# Patient Record
Sex: FEMALE | Race: WHITE
[De-identification: names, ages, dates, MRNs, and addresses within clinical notes are randomized per-mention and may not be internally consistent; named-entity substitution may affect disease eponyms.]

---

## 2021-02-25 ENCOUNTER — HOSPITAL ENCOUNTER (OUTPATIENT)
Dept: HOSPITAL 61 - ER | Age: 41
Setting detail: OBSERVATION
LOS: 1 days | Discharge: HOME | End: 2021-02-26
Attending: INTERNAL MEDICINE | Admitting: INTERNAL MEDICINE
Payer: COMMERCIAL

## 2021-02-25 VITALS — SYSTOLIC BLOOD PRESSURE: 121 MMHG | DIASTOLIC BLOOD PRESSURE: 73 MMHG

## 2021-02-25 VITALS — BODY MASS INDEX: 28.31 KG/M2 | HEIGHT: 66 IN | WEIGHT: 176.15 LBS

## 2021-02-25 VITALS — SYSTOLIC BLOOD PRESSURE: 119 MMHG | DIASTOLIC BLOOD PRESSURE: 67 MMHG

## 2021-02-25 VITALS — DIASTOLIC BLOOD PRESSURE: 85 MMHG | SYSTOLIC BLOOD PRESSURE: 137 MMHG

## 2021-02-25 DIAGNOSIS — F15.10: ICD-10-CM

## 2021-02-25 DIAGNOSIS — I47.1: Primary | ICD-10-CM

## 2021-02-25 DIAGNOSIS — F32.9: ICD-10-CM

## 2021-02-25 DIAGNOSIS — R07.89: ICD-10-CM

## 2021-02-25 DIAGNOSIS — Z83.3: ICD-10-CM

## 2021-02-25 DIAGNOSIS — Z79.899: ICD-10-CM

## 2021-02-25 DIAGNOSIS — E87.6: ICD-10-CM

## 2021-02-25 DIAGNOSIS — E03.9: ICD-10-CM

## 2021-02-25 DIAGNOSIS — F90.9: ICD-10-CM

## 2021-02-25 DIAGNOSIS — D64.9: ICD-10-CM

## 2021-02-25 DIAGNOSIS — F17.210: ICD-10-CM

## 2021-02-25 LAB
ALBUMIN SERPL-MCNC: 3.5 G/DL (ref 3.4–5)
ALBUMIN/GLOB SERPL: 1 {RATIO} (ref 1–1.7)
ALP SERPL-CCNC: 69 U/L (ref 46–116)
ALT SERPL-CCNC: 33 U/L (ref 14–59)
AMPHETAMINE/METHAMPHETAMINE: (no result)
ANION GAP SERPL CALC-SCNC: 18 MMOL/L (ref 6–14)
ANISOCYTOSIS BLD QL SMEAR: SLIGHT
AST SERPL-CCNC: 33 U/L (ref 15–37)
BARBITURATES UR-MCNC: (no result) UG/ML
BASOPHILS # BLD AUTO: 0.1 X10^3/UL (ref 0–0.2)
BASOPHILS NFR BLD: 1 % (ref 0–3)
BENZODIAZ UR-MCNC: (no result) UG/L
BILIRUB SERPL-MCNC: 0.2 MG/DL (ref 0.2–1)
BIZZARE CELLS: (no result)
BUN SERPL-MCNC: 19 MG/DL (ref 7–20)
BUN/CREAT SERPL: 17 (ref 6–20)
BURR CELLS BLD QL SMEAR: (no result)
CALCIUM SERPL-MCNC: 8.4 MG/DL (ref 8.5–10.1)
CANNABINOIDS UR-MCNC: (no result) UG/L
CHLORIDE SERPL-SCNC: 104 MMOL/L (ref 98–107)
CO2 SERPL-SCNC: 19 MMOL/L (ref 21–32)
COCAINE UR-MCNC: (no result) NG/ML
CREAT SERPL-MCNC: 1.1 MG/DL (ref 0.6–1)
EOSINOPHIL NFR BLD: 0.2 X10^3/UL (ref 0–0.7)
EOSINOPHIL NFR BLD: 3 % (ref 0–3)
ERYTHROCYTE [DISTWIDTH] IN BLOOD BY AUTOMATED COUNT: 19.9 % (ref 11.5–14.5)
GFR SERPLBLD BASED ON 1.73 SQ M-ARVRAT: 54.2 ML/MIN
GLUCOSE SERPL-MCNC: 157 MG/DL (ref 70–99)
HCT VFR BLD CALC: 31.1 % (ref 36–47)
HGB BLD-MCNC: 9.4 G/DL (ref 12–15.5)
HYPOCHROMIA BLD QL SMEAR: (no result)
LYMPHOCYTES # BLD: 2.1 X10^3/UL (ref 1–4.8)
LYMPHOCYTES NFR BLD AUTO: 23 % (ref 24–48)
MCH RBC QN AUTO: 21 PG (ref 25–35)
MCHC RBC AUTO-ENTMCNC: 30 G/DL (ref 31–37)
MCV RBC AUTO: 69 FL (ref 79–100)
METHADONE SERPL-MCNC: (no result) NG/ML
MICROCYTES BLD QL SMEAR: (no result)
MONO #: 1.5 X10^3/UL (ref 0–1.1)
MONOCYTES NFR BLD: 17 % (ref 0–9)
NEUT #: 5.2 X10^3/UL (ref 1.8–7.7)
NEUTROPHILS NFR BLD AUTO: 56 % (ref 31–73)
OPIATES UR-MCNC: (no result) NG/ML
OVALOCYTES BLD QL SMEAR: (no result)
PCP SERPL-MCNC: (no result) MG/DL
PLATELET # BLD AUTO: 344 X10^3/UL (ref 140–400)
PLATELET # BLD EST: ADEQUATE 10*3/UL
POIKILOCYTOSIS BLD QL SMEAR: SLIGHT
POLYCHROMASIA BLD QL SMEAR: SLIGHT
POTASSIUM SERPL-SCNC: 2.7 MMOL/L (ref 3.5–5.1)
PROT SERPL-MCNC: 7.1 G/DL (ref 6.4–8.2)
RBC # BLD AUTO: 4.52 X10^6/UL (ref 3.5–5.4)
SCHISTOCYTES BLD QL SMEAR: (no result)
SODIUM SERPL-SCNC: 141 MMOL/L (ref 136–145)
TARGETS BLD QL SMEAR: (no result)
WBC # BLD AUTO: 9.2 X10^3/UL (ref 4–11)

## 2021-02-25 PROCEDURE — 96375 TX/PRO/DX INJ NEW DRUG ADDON: CPT

## 2021-02-25 PROCEDURE — 36415 COLL VENOUS BLD VENIPUNCTURE: CPT

## 2021-02-25 PROCEDURE — 83735 ASSAY OF MAGNESIUM: CPT

## 2021-02-25 PROCEDURE — 99285 EMERGENCY DEPT VISIT HI MDM: CPT

## 2021-02-25 PROCEDURE — 93306 TTE W/DOPPLER COMPLETE: CPT

## 2021-02-25 PROCEDURE — 84484 ASSAY OF TROPONIN QUANT: CPT

## 2021-02-25 PROCEDURE — 80053 COMPREHEN METABOLIC PANEL: CPT

## 2021-02-25 PROCEDURE — 84443 ASSAY THYROID STIM HORMONE: CPT

## 2021-02-25 PROCEDURE — 96366 THER/PROPH/DIAG IV INF ADDON: CPT

## 2021-02-25 PROCEDURE — 83880 ASSAY OF NATRIURETIC PEPTIDE: CPT

## 2021-02-25 PROCEDURE — 96361 HYDRATE IV INFUSION ADD-ON: CPT

## 2021-02-25 PROCEDURE — 80307 DRUG TEST PRSMV CHEM ANLYZR: CPT

## 2021-02-25 PROCEDURE — 71045 X-RAY EXAM CHEST 1 VIEW: CPT

## 2021-02-25 PROCEDURE — 93005 ELECTROCARDIOGRAM TRACING: CPT

## 2021-02-25 PROCEDURE — 85025 COMPLETE CBC W/AUTO DIFF WBC: CPT

## 2021-02-25 PROCEDURE — G0378 HOSPITAL OBSERVATION PER HR: HCPCS

## 2021-02-25 PROCEDURE — 80048 BASIC METABOLIC PNL TOTAL CA: CPT

## 2021-02-25 PROCEDURE — 96365 THER/PROPH/DIAG IV INF INIT: CPT

## 2021-02-25 PROCEDURE — G0379 DIRECT REFER HOSPITAL OBSERV: HCPCS

## 2021-02-25 PROCEDURE — 82728 ASSAY OF FERRITIN: CPT

## 2021-02-25 NOTE — PDOC2
KHANH HARMON APRN 2/25/21 1419:


CARDIAC CONSULT


DATE OF CONSULT


Date of Consult


DATE: 2/25/21 


TIME: 14:10





REASON FOR CONSULT


Reason for Consult:


SVT





REFERRING PHYSICIAN


Referring Physician:


Dr. Prather





SOURCE


Source:  Chart review, Patient





HISTORY OF PRESENT ILLNESS


HISTORY OF PRESENT ILLNESS


This is a 42 yo female, with a history of PSVT and recently diagnosis 

hypothyroidism initiated on lecvothyroxine, who presented secondary to chest 

pain and palpitations. Patient reports she bent over to  her phone this 

morning and began having intense palpitations and chest pressure. Could feel her

heart beating rapidly in her neck. Was nauseated and short of breath. Tried 

vagal maneuvers, but symptoms did not resolve so she came to the ED for further 

evaluation and treatment. Reports h/o PSVT since young adulathood. Has underwent

cardiac workup previously with Dr. Guadarrama. although she was not on av jose a 

blocking agent. 





Noted in SVT upon arrival. Received adenosine 6mg followed by additional 12mg 

and she convert successfully back to SR/ST. Chest pain, pressure has resolved. 

She denies any recreational drug use or significant caffeine intake.





PAST MEDICAL HISTORY


Cardiovascular:  Other (PSVT )


Psych:  Depression, Other (ADHD)


Endocrine:  Hypothyroidism





PAST SURGICAL HISTORY


Past Surgical History:  No pertinent history





FAMILY HISTORY


Family History:  Heart Disease, Hypothyroidism





SOCIAL HISTORY


Smoke:  1 pack per day


ALCOHOL:  none


Drugs:  None


Lives:  with Family





CURRENT MEDICATIONS


CURRENT MEDICATIONS





Current Medications








 Medications


  (Trade)  Dose


 Ordered  Sig/Kwame


 Route


 PRN Reason  Start Time


 Stop Time Status Last Admin


Dose Admin


 


 Adenosine


  (Adenocard)  12 mg  1X  ONCE


 IV


   2/25/21 11:45


 2/25/21 11:46 DC 2/25/21 11:40





 


 Sodium Chloride  1,000 ml @ 


 1,000 mls/hr  1X  ONCE


 IV


   2/25/21 11:45


 2/25/21 12:44 DC 2/25/21 11:40





 


 Potassium Chloride


  (Klor-Con)  40 meq  1X  ONCE


 PO


   2/25/21 12:15


 2/25/21 12:16 DC 2/25/21 13:24














ALLERGIES


ALLERGIES:  


Coded Allergies:  


     No Known Drug Allergies (Unverified , 2/25/21)





ROS


Review of System


14 point ROS conducted with pertinent positive noted above in hPi





PHYSICAL EXAM


General:  Alert, Oriented X3, Cooperative, No acute distress


HEENT:  Atraumatic, Mucous membr. moist/pink


Lungs:  Clear to auscultation


Heart:  Regular rate (SR/ST)


Abdomen:  Soft, No tenderness


Extremities:  No edema, Normal pulses


Skin:  No breakdown


Neuro:  Normal speech, Sensation intact


Psych/Mental Status:  Mental status NL, Mood NL


MUSCULOSKELETAL:  No deformity





VITALS/I&O


VITALS/I&O:





                                   Vital Signs








  Date Time  Temp Pulse Resp B/P (MAP) Pulse Ox O2 Delivery O2 Flow Rate FiO2


 


2/25/21 12:46  94 18  100   


 


2/25/21 10:54 98.2   117/77 (90)  Room Air  





 98.2       











LABS


Lab:





                                Laboratory Tests








Test


 2/25/21


11:17


 


White Blood Count


 9.2 x10^3/uL


(4.0-11.0)


 


Red Blood Count


 4.52 x10^6/uL


(3.50-5.40)


 


Hemoglobin


 9.4 g/dL


(12.0-15.5)  L


 


Hematocrit


 31.1 %


(36.0-47.0)  L


 


Mean Corpuscular Volume


 69 fL ()


L


 


Mean Corpuscular Hemoglobin


 21 pg (25-35)


L


 


Mean Corpuscular Hemoglobin


Concent 30 g/dL


(31-37)  L


 


Red Cell Distribution Width


 19.9 %


(11.5-14.5)  H


 


Platelet Count


 344 x10^3/uL


(140-400)


 


Neutrophils (%) (Auto) 56 % (31-73)  


 


Lymphocytes (%) (Auto) 23 % (24-48)  L


 


Monocytes (%) (Auto) 17 % (0-9)  H


 


Eosinophils (%) (Auto) 3 % (0-3)  


 


Basophils (%) (Auto) 1 % (0-3)  


 


Neutrophils # (Auto)


 5.2 x10^3/uL


(1.8-7.7)


 


Lymphocytes # (Auto)


 2.1 x10^3/uL


(1.0-4.8)


 


Monocytes # (Auto)


 1.5 x10^3/uL


(0.0-1.1)  H


 


Eosinophils # (Auto)


 0.2 x10^3/uL


(0.0-0.7)


 


Basophils # (Auto)


 0.1 x10^3/uL


(0.0-0.2)


 


Platelet Estimate


 Adequate


(ADEQUATE)


 


Large Platelets Few  


 


Giant Platelets Occ  


 


Polychromasia Slight  


 


Hypochromasia Marked  


 


Poikilocytosis Slight  


 


Anisocytosis Slight  


 


Microcytosis Marked  


 


Target Cells Occ  


 


Ovalocytes Few  


 


Gabriel Cells Few  


 


Schistocytes Occ  


 


RBC Morphology Bizarre Forms Occ  


 


Sodium Level


 141 mmol/L


(136-145)


 


Potassium Level


 2.7 mmol/L


(3.5-5.1)  *L


 


Chloride Level


 104 mmol/L


()


 


Carbon Dioxide Level


 19 mmol/L


(21-32)  L


 


Anion Gap 18 (6-14)  H


 


Blood Urea Nitrogen


 19 mg/dL


(7-20)


 


Creatinine


 1.1 mg/dL


(0.6-1.0)  H


 


Estimated GFR


(Cockcroft-Gault) 54.2  





 


BUN/Creatinine Ratio 17 (6-20)  


 


Glucose Level


 157 mg/dL


(70-99)  H


 


Calcium Level


 8.4 mg/dL


(8.5-10.1)  L


 


Total Bilirubin


 0.2 mg/dL


(0.2-1.0)


 


Aspartate Amino Transferase


(AST) 33 U/L (15-37)





 


Alanine Aminotransferase (ALT)


 33 U/L (14-59)





 


Alkaline Phosphatase


 69 U/L


()


 


Troponin I Quantitative


 0.036 ng/mL


(0.000-0.055)


 


NT-Pro-B-Type Natriuretic


Peptide 1424 pg/mL


(0-124)  H


 


Total Protein


 7.1 g/dL


(6.4-8.2)


 


Albumin


 3.5 g/dL


(3.4-5.0)


 


Albumin/Globulin Ratio 1.0 (1.0-1.7)  





                                Laboratory Tests


2/25/21 11:17








                                Laboratory Tests


2/25/21 11:17











ASSESSMENT/PLAN


ASSESSMENT/PLAN


1.  Chest pain, palpitations in setting of #2. Resolved 


2.  PSVT; converted back to SR with adenosine


3.  Hypokalemia; replaced 


4.  Hypothyroidism; on replacement 


5 . ADHD; on Adderall 








Recommendations





Check Mg and replace as warranted 


TSH


Echo to assess LV systolic function 


Start metoprolol for suppression


Add ASA 


Outpatient referral to EP


Supportive care





CRISTEL CHO MD 2/25/21 2088:


CARDIAC CONSULT


ASSESSMENT/PLAN


ASSESSMENT/PLAN


Pt. seen and examined. Agree with above NP note. 


Possible AVNRT or fasicular VT on differential. 


Will plan for outpt ischemic testing, referral to EP. 


Continue b-blocker.











KHANH HARMON           Feb 25, 2021 14:19


CRISTEL CHO MD       Feb 25, 2021 17:45

## 2021-02-25 NOTE — HP
ADMIT DATE:  02/25/2021



CHIEF COMPLAINT:  Palpitations.



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

works as an LPN.  She presented with palpitations, was noted to be in SVT. 

While in the ER, we also noticed that she has got hypokalemia.  We gave her 2

doses of adenosine that eventually work.  It should be noted that she is also

amphetamine/methamphetamine positive on drug screen.  I suspect this could be

playing a role.  I discussed the case with ER physician.  We are going to admit

the patient and consult Cardiology.



PAST MEDICAL HISTORY:  Possible meth abuse, depression, hypothyroidism, tobacco

abuse.



ALLERGIES:  None.



FAMILY HISTORY:  Diabetes.



SOCIAL HISTORY:  She does smoke, but no drink or drugs.  She works as an LPN.



HOME MEDICATIONS:  Adderall, Ultram, fluoxetine, Singulair, and Synthroid.



REVIEW OF SYSTEMS:  

GENERAL:  No history of weight change, weakness or fevers.

SKIN:  No bruising, hair changes or rashes.

EYES:  No blurred, double or loss of vision.

NOSE AND THROAT:  No history of nosebleeds, hoarseness or sore throat.

HEART:  No history of palpitations, chest pain or shortness of breath on

exertion.

LUNGS:  Denies cough, hemoptysis, wheezing or shortness of breath.

GASTROINTESTINAL:  Denies changes in appetite, nausea, vomiting, diarrhea or

constipation.

GENITOURINARY:  No history of frequency, urgency, hesitancy or nocturia.

NEUROLOGIC:  Denies history of numbness, tingling, tremor or weakness.

PSYCHIATRIC:  No history of panic, anxiety or depression.

ENDOCRINE:  No history of heat or cold intolerance, polyuria or polydipsia.

EXTREMITIES:  Denies muscle weakness, joint pain, pain on walking or stiffness.



PHYSICAL EXAMINATION:

VITALS:  Within normal limits and are stable.

GENERAL:  No apparent distress.  Alert and oriented.

HEENT:  Normal cephalic atraumatic, external auditory canals are patent

EYES:  Extraocular muscles are intact, pupils are equally round and reactive to

light and accommodation

MUSCULOSKELETAL:  Well developed, well nourished, good range of motion

ENDOCRINE:  No thyromegaly was palpated

LYMPHATICS:  No cervical chain or axillary nodes were noted

HEMATOPOIETIC:  No bruising

NECK:  Supple, no JVD, no thyromegaly was noted.

LUNGS:  Clear to auscultation in all lung fields without rhonchi or wheezing.

HEART:  RRR, S1, S2 present.  Peripheral pulses intact, no obvious murmurs were

noted.

ABDOMEN:  Soft, nontender.  Positive bowel sounds no organomegaly, normal bowel

sounds.

EXTREMITIES:  Without any cyanosis, clubbing, or edema.  Pedal pulses intact,

Homans sign is negative.

NEUROLOGIC:  Normal speech, normal tone.  A & O x3, moves all extremities, no

obvious focal deficits.

PSYCHIATRIC:  Normal affect, normal mood.  Stable.

SKIN:  She has multiple tattoos.

VASCULAR:  Good capillary refill, neurovascular bundle appears to be intact.



LABORATORY DATA:  White count 9, hemoglobin 9, platelets 344.  Electrolytes: 

Sodium 141, potassium 2.7, chloride 104, bicarbonate 19, BUN 19, creatinine 1.1,

glucose 157.  Troponin is 0.36, BNP 1424.  Drug screen positive for

amphetamine/methamphetamine.



ASSESSMENT AND PLAN:  SVT and with incidental finding of

amphetamine/methamphetamine positive, but she is on Adderall, so I suspect that

might account for that.  She also has hypokalemia.



PLAN:  Replace her potassium.  We gave her a couple doses of adenosine.  We have

started metoprolol per cardiology's recommendations.  Home meds, DVT

prophylaxis.  Full code.  I told her to try to avoid caffeine and cigarettes.



TOTAL TIME:  31 minutes.

 



______________________________

FLORENCIA LEARY DO DR:  KING/alona  JOB#:  [id number] / [id number]

DD:  02/25/2021 17:29  DT:  02/25/2021 18:39

## 2021-02-25 NOTE — EKG
[facility]

              [address] [place] [address]

Test Date:    2021               Test Time:    11:02:17

Pat Name:     REI STANFORD           Department:   

Patient ID:   [id number]           Room:          

Gender:       F                        Technician:   

:          1980               Requested By: SHANNEN MENDIETA

Order Number: [id number]           Reading MD:   Afshin Villanueva MD

                                 Measurements

Intervals                              Axis          

Rate:         233                      P:            

NC:                                    QRS:          188

QRSD:         156                      T:            -26

QT:           252                                    

QTc:          504                                    

                           Interpretive Statements

SVT

Electronically Signed On 3-1-2021 9:05:05 CST by Afshin Villanueva MD

## 2021-02-25 NOTE — ED.ADGEN
Past Medical History


Past Medical History


SVT





General Adult


EDM:


Chief Complaint:  RAPID HEART RATE





HPI:


HPI:





Patient is a 41-year-old female who arrives ambulatory to the emergency depart

ment complaining of palpitations as well as chest discomfort.  Patient reports 

she was cleaning her basement this morning when she stood up and felt her heart 

race in addition experiencing chest discomfort.  Patient reports she sat down 

point had the symptoms returned again.  Patient states she does have a history 

of SVT and upon arrival the patient has an elevated heart rate.  Upon my 

inspection the patient has what appears to be SVT with a heart rate above 230 

bpm.  The patient does report to feeling lightheaded in addition to having chest

discomfort.  The patient does report that she recently started new thyroid 

medication as she has hypothyroid disease.  She denies any previous illness.  

She further states she does not have pain whenever she is without palpitations. 

She is awake, alert and uncomfortable appearing.





Review of Systems:


Review of Systems:


Constitutional:   Denies fever or chills. []


Eyes:   Denies change in visual acuity. []


HENT:   Denies nasal congestion or sore throat. [] 


Respiratory:   Denies cough or shortness of breath. [] 


Cardiovascular:   Reports lightheadedness, palpitations and chest discomfort. 

Denies edema. [] 


GI:   Denies abdominal pain, nausea, vomiting, bloody stools or diarrhea. [] 


:  Denies dysuria. [] 


Musculoskeletal:   Denies back pain or joint pain. [] 


Integument:   Denies rash. [] 


Neurologic:   Denies headache, focal weakness or sensory changes. [] 


Endocrine:   Denies polyuria or polydipsia. [] 


Lymphatic:  Denies swollen glands. [] 


Psychiatric:  Denies depression or anxiety. []





Current Medications:





Current Medications








 Medications


  (Trade)  Dose


 Ordered  Sig/Kwame  Start Time


 Stop Time Status Last Admin


Dose Admin


 


 Adenosine


  (Adenocard)  12 mg  1X  ONCE  21 11:45


 21 11:46 DC 21 11:40


12 MG


 


 Potassium Chloride


  (Klor-Con)  40 meq  1X  ONCE  21 12:15


 21 12:16 DC  





 


 Sodium Chloride  1,000 ml @ 


 1,000 mls/hr  1X  ONCE  21 11:45


 21 12:44  21 11:40


1,000 MLS/HR











Allergies:


Allergies:





Allergies








Coded Allergies Type Severity Reaction Last Updated Verified


 


  No Known Drug Allergies    21 No











Physical Exam:


PE:





Constitutional: The patient appears to be in extremis and uncomfortable 

appearing.  Well developed, well nourished. []


HENT: Normocephalic, atraumatic, bilateral external ears normal, oropharynx 

moist, no oral exudates, nose normal. []


Eyes: PERRLA, EOMI, conjunctiva normal, no discharge. [] 


Neck: Normal range of motion, no tenderness, supple, no stridor. [] 


Cardiovascular: Tachycardia without murmurs or rubs.  


Lungs & Thorax:  Bilateral breath sounds clear to auscultation []


Abdomen: Bowel sounds normal, soft, no tenderness, no masses, no pulsatile 

masses. [] 


Skin: Warm, dry, no erythema, no rash. [] 


Back: No tenderness, no CVA tenderness. [] 


Extremities: No tenderness, no cyanosis, no clubbing, ROM intact, no edema. [] 


Neurologic: Alert and oriented X 3, normal motor function, normal sensory 

function, no focal deficits noted. []


Psychologic: Affect normal, judgement normal, mood normal. []





Current Patient Data:


Labs:





                                Laboratory Tests








Test


 21


11:17


 


White Blood Count


 9.2 x10^3/uL


(4.0-11.0)


 


Red Blood Count


 4.52 x10^6/uL


(3.50-5.40)


 


Hemoglobin


 9.4 g/dL


(12.0-15.5)  L


 


Hematocrit


 31.1 %


(36.0-47.0)  L


 


Mean Corpuscular Volume


 69 fL ()


L


 


Mean Corpuscular Hemoglobin


 21 pg (25-35)


L


 


Mean Corpuscular Hemoglobin


Concent 30 g/dL


(31-37)  L


 


Red Cell Distribution Width


 19.9 %


(11.5-14.5)  H


 


Platelet Count


 344 x10^3/uL


(140-400)


 


Neutrophils (%) (Auto) 56 % (31-73)  


 


Lymphocytes (%) (Auto) 23 % (24-48)  L


 


Monocytes (%) (Auto) 17 % (0-9)  H


 


Eosinophils (%) (Auto) 3 % (0-3)  


 


Basophils (%) (Auto) 1 % (0-3)  


 


Neutrophils # (Auto)


 5.2 x10^3/uL


(1.8-7.7)


 


Lymphocytes # (Auto)


 2.1 x10^3/uL


(1.0-4.8)


 


Monocytes # (Auto)


 1.5 x10^3/uL


(0.0-1.1)  H


 


Eosinophils # (Auto)


 0.2 x10^3/uL


(0.0-0.7)


 


Basophils # (Auto)


 0.1 x10^3/uL


(0.0-0.2)


 


Platelet Estimate Pending  


 


Sodium Level


 141 mmol/L


(136-145)


 


Potassium Level


 2.7 mmol/L


(3.5-5.1)  *L


 


Chloride Level


 104 mmol/L


()


 


Carbon Dioxide Level


 19 mmol/L


(21-32)  L


 


Anion Gap 18 (6-14)  H


 


Blood Urea Nitrogen


 19 mg/dL


(7-20)


 


Creatinine


 1.1 mg/dL


(0.6-1.0)  H


 


Estimated GFR


(Cockcroft-Gault) 54.2  





 


BUN/Creatinine Ratio 17 (6-20)  


 


Glucose Level


 157 mg/dL


(70-99)  H


 


Calcium Level


 8.4 mg/dL


(8.5-10.1)  L


 


Total Bilirubin


 0.2 mg/dL


(0.2-1.0)


 


Aspartate Amino Transferase


(AST) 33 U/L (15-37)





 


Alanine Aminotransferase (ALT)


 33 U/L (14-59)





 


Alkaline Phosphatase


 69 U/L


()


 


Troponin I Quantitative


 0.036 ng/mL


(0.000-0.055)


 


NT-Pro-B-Type Natriuretic


Peptide 1424 pg/mL


(0-124)  H


 


Total Protein


 7.1 g/dL


(6.4-8.2)


 


Albumin


 3.5 g/dL


(3.4-5.0)


 


Albumin/Globulin Ratio 1.0 (1.0-1.7)  





                                Laboratory Tests


21 11:17








                                Laboratory Tests


21 11:17








Vital Signs:





                                   Vital Signs








  Date Time  Temp Pulse Resp B/P (MAP) Pulse Ox O2 Delivery O2 Flow Rate FiO2


 


21 10:54 98.2 250 24 117/77 (90) 99 Room Air  





 98.2       











EKG:


EKG:


[] Initial EKG was obtained at 11:02 AM which revealed supraventricular 

tachycardia with a ventricular rate of 233 bpm.  Repeat EKG was obtained at 

11:08 AM which revealed a sinus tachycardia with a ventricular heart rate of 103

 bpm with premature atrial complexes.  There are no acute ST/T wave changes 

present to denote ischemia.  The second EKG was obtained after the 

administration of 6 and then 12 mg of adenosine which appeared to have been 

successful.





Heart Score:


HEART Score for Chest Pain:  








HEART Score for Chest Pain Response (Comments) Value


 


History Moderately Suspicious 1


 


ECG Nonspecific Repolarizatio 1


 


Age < 45 0


 


Risk Factors No Risk Factors 0


 


Troponin < Normal Limit 0


 


Total  2








Risk Factors:


Risk Factors:  DM, Current or recent (<one month) smoker, HTN, HLP, family 

history of CAD, obesity.


Risk Scores:


Score 0 - 3:  2.5% MACE over next 6 weeks - Discharge Home


Score 4 - 6:  20.3% MACE over next 6 weeks - Admit for Clinical Observation


Score 7 - 10:  72.7% MACE over next 6 weeks - Early Invasive Strategies





Radiology/Procedures:


Radiology/Procedures:


[]


Impression:


[facility]


                    [address] [place] [address]


                                 [phone]


                                        


                                 IMAGING REPORT





                                     Signed





PATIENT: REI STANFORD   ACCOUNT: [id number]     MRN#: [id number]


: 1980           LOCATION: ER              AGE: 41


SEX: F                    EXAM DT: 21         ACCESSION#: [id number]


STATUS: REG ER            ORD. PHYSICIAN: SHANNEN MENDIETA DO


REASON: Pain/palpitation


PROCEDURE: CHEST AP ONLY





XR CHEST 1V





History: Reason: Pain/palpitation / Spl. Instructions:  / History: 





Comparison: None.





Technique: Portable AP chest radiograph.





Findings:


The lungs are adequately and symmetrically inflated. No airspace consolidation, 

pleural effusion or pneumothorax. The cardiomediastinal silhouette and pulmonary

 vasculature are within normal limits. Soft tissues and osseous structures are 

unremarkable.





Impression: 


1.  No acute cardiopulmonary process.





Electronically signed by: Edgar Contreras MD (2021 12:15 PM) [facility]














DICTATED and SIGNED BY:     EDGAR CONTRERAS MD


DATE:     21 [id number] 0








Course & Med Decision Making:


Course & Med Decision Making


Pertinent Labs and Imaging studies reviewed. (See chart for details)





[]





Dragon Disclaimer:


Dragon Disclaimer:


This electronic medical record was generated, in whole or in part, using a voice

 recognition dictation system.





Departure


Departure


Impression:  


   Primary Impression:  


   Supraventricular tachycardia


   Additional Impressions:  


   Chest pain


   Hypokalemia


   Anemia


Disposition:  09 ADMITTED AS INPT THIS HOSP


Admitting Physician:  HIMS


Condition:  IMPROVED


Referrals:  


CINDY STANFORD MD (PCP)





Critical Care Note


Total Time (mins):  15


Comments


Critical care time was utilized in the assessment and treatment of the patient. 

 This is excluding procedures.





Problem Qualifiers











SHANNEN MENDIETA DO               2021 11:18

## 2021-02-25 NOTE — RAD
XR CHEST 1V



History: Reason: Pain/palpitation / Spl. Instructions:  / History: 



Comparison: None.



Technique: Portable AP chest radiograph.



Findings:

The lungs are adequately and symmetrically inflated. No airspace consolidation, pleural effusion or p
neumothorax. The cardiomediastinal silhouette and pulmonary vasculature are within normal limits. Sof
t tissues and osseous structures are unremarkable.



Impression: 

1.  No acute cardiopulmonary process.



Electronically signed by: Edgar Florian MD (2/25/2021 12:15 PM) [facility]

## 2021-02-26 VITALS — SYSTOLIC BLOOD PRESSURE: 140 MMHG | DIASTOLIC BLOOD PRESSURE: 103 MMHG

## 2021-02-26 VITALS — SYSTOLIC BLOOD PRESSURE: 126 MMHG | DIASTOLIC BLOOD PRESSURE: 86 MMHG

## 2021-02-26 VITALS — DIASTOLIC BLOOD PRESSURE: 72 MMHG | SYSTOLIC BLOOD PRESSURE: 119 MMHG

## 2021-02-26 LAB
ANION GAP SERPL CALC-SCNC: 12 MMOL/L (ref 6–14)
BUN SERPL-MCNC: 12 MG/DL (ref 7–20)
CALCIUM SERPL-MCNC: 7.9 MG/DL (ref 8.5–10.1)
CHLORIDE SERPL-SCNC: 108 MMOL/L (ref 98–107)
CO2 SERPL-SCNC: 22 MMOL/L (ref 21–32)
CREAT SERPL-MCNC: 0.6 MG/DL (ref 0.6–1)
GFR SERPLBLD BASED ON 1.73 SQ M-ARVRAT: 109.1 ML/MIN
GLUCOSE SERPL-MCNC: 92 MG/DL (ref 70–99)
MAGNESIUM SERPL-MCNC: 2 MG/DL (ref 1.8–2.4)
POTASSIUM SERPL-SCNC: 3.2 MMOL/L (ref 3.5–5.1)
SODIUM SERPL-SCNC: 142 MMOL/L (ref 136–145)

## 2021-02-26 NOTE — CARD
MR#: [id number]

Account#: [id number]

Accession#: [id number]

Date of Study: 02/26/2021

Ordering Physician: KHANH HARMON, 

Referring Physician: KHANH HARMON, 

Tech: Mili Vaughn, [facility]





--------------- APPROVED REPORT --------------





EXAM: Two-dimensional and M-mode echocardiogram with Doppler and color Doppler.



Other Information 

Quality : AverageHR: 78bpm



INDICATION

Palpitations 

Tachycardia



RISK FACTORS

Smoking 



2D DIMENSIONS 

RVDd3.0 (2.9-3.5cm)Left Atrium(2D)3.7 (1.6-4.0cm)

IVSd0.9 (0.7-1.1cm)Aortic Root(2D)3.0 (2.0-3.7cm)

LVDd5.6 (3.9-5.9cm)LVOT Diameter2.1 (1.8-2.4cm)

PWd1.0 (0.7-1.1cm)LVDs4.2 (2.5-4.0cm)

FS (%) 25.7 %SV77.2 ml

LVEF(%)50.0 (>50%)



Aortic Valve

AoV Peak Giovani.152.2cm/sAoV VTI28.9cm

AO Peak GR.9.3mmHgLVOT Peak Giovani.111.7cm/s

LVOT  VTI 20.35cmAO Mean GR.5mmHg

JOSE (VMAX)1.07du6DHI   (VTI)2.34cm2



Mitral Valve

MV E Zqjjucub49.7cm/sMV E Peak Gr.100mmHg

MV DECEL [id number] A Gclvmurs95.0cm/s

MV DYY40gtJ/A  Ratio1.0

MVA (PHT)2.77cm2



TDI

E/Lateral E'13.6E/Medial E'10.4



Pulmonary Valve

PV Peak Negcwoej81.0cm/sPV Peak Grad.3mmHg



Tricuspid Valve

TR P. Kyjgwzkp116ic/sRAP VCXVIEHT5eaSl

TR Peak Gr.06emJpEEWQ31ulWg



Pulmonary Vein

S1 Rchqlhjs14.6cm/sD2 Dkhvebxp23.6cm/s



 LEFT VENTRICLE 

The left ventricle is normal size. There is normal left ventricular wall thickness. The left ventricu
lar systolic function is normal and the ejection fraction is within normal range. The Ejection Fracti
on is 50-55%. There is normal LV segmental wall motion. Transmitral Doppler flow pattern is Grade I-a
bnormal relaxation pattern.



 RIGHT VENTRICLE 

The right ventricle is normal size. There is normal right ventricular wall thickness. The right ventr
icular systolic function is normal.



 ATRIA 

The left atrium size is normal. The right atrium size is normal. The interatrial septum is intact wit
h no evidence for an atrial septal defect or patent foramen ovale as noted on 2-D or Doppler imaging.




 AORTIC VALVE 

The aortic valve is thickened but opens well. Doppler and Color Flow revealed no significant aortic r
egurgitation. Calculated aortic valve area is 2.14 cm2 with maximum pressure gradient of 11 mmHg and 
mean pressure gradient of 6 mmHg.



 MITRAL VALVE 

The mitral valve is normal in structure and function. There is no evidence of mitral valve prolapse. 
There is no mitral valve stenosis. Doppler and Color-flow revealed trace mitral regurgitation.



 TRICUSPID VALVE 

The tricuspid valve is normal in structure and function. Doppler and Color Flow revealed trace tricus
pid regurgitation with an estimated PAP of 33 mmHg. There is no tricuspid valve stenosis.



 PULMONIC VALVE 

Doppler and Color Flow revealed trace pulmonic valvular regurgitation. There is no pulmonic valvular 
stenosis.



 GREAT VESSELS 

The aortic root is normal in size. The IVC is dilated.



 PERICARDIAL EFFUSION 

There is no evidence of significant pericardial effusion.



Critical Notification

Critical Value: No



<Conclusion>

The left ventricular systolic function is normal and the ejection fraction is within normal range. Th
e Ejection Fraction is 50-55%.

There is normal LV segmental wall motion.



Signed by : Afshin Villanueva, 

Electronically Approved : 02/26/2021 11:54:55

## 2021-02-26 NOTE — PDOC
ANAYELI PANDA APRN 2/26/21 1441:


CARDIO Progress Notes


Date and Time


Date of Service


2/26/2021


Time of Evaluation


1350





Subjective


Subjective:  No Chest Pain, No shortness of breath, No Palpitations





Vitals


Vitals





Vital Signs








  Date Time  Temp Pulse Resp B/P (MAP) Pulse Ox O2 Delivery O2 Flow Rate FiO2


 


2/26/21 11:00 99.0 89 17 119/72 (88) 99 Room Air  





 99.0       








Weight


Weight [ ]





Input and Output


Intake and Output











Intake and Output 


 


 2/26/21





 07:00


 


Intake Total 1250 ml


 


Output Total 500 ml


 


Balance 750 ml


 


 


 


Intake Oral 250 ml


 


IV Total 1000 ml


 


Output Urine Total 500 ml











Laboratory


Labs





Laboratory Tests








Test


 2/25/21


17:00 2/25/21


17:10 2/26/21


07:35


 


Urine Opiates Screen Neg (NEG)   


 


Urine Methadone Screen Neg (NEG)   


 


Urine Barbiturates Neg (NEG)   


 


Urine Phencyclidine Screen Neg (NEG)   


 


Urine


Amphetamine/Methamphetamine Pos (NEG) 


 


 





 


Urine Benzodiazepines Screen Neg (NEG)   


 


Urine Cocaine Screen Neg (NEG)   


 


Urine Cannabinoids Screen Neg (NEG)   


 


Urine Ethyl Alcohol Neg (NEG)   


 


Troponin I Quantitative


 


 0.170 ng/mL


(0.000-0.055) 





 


Sodium Level


 


 


 142 mmol/L


(136-145)


 


Potassium Level


 


 


 3.2 mmol/L


(3.5-5.1)


 


Chloride Level


 


 


 108 mmol/L


()


 


Carbon Dioxide Level


 


 


 22 mmol/L


(21-32)


 


Anion Gap   12 (6-14) 


 


Blood Urea Nitrogen


 


 


 12 mg/dL


(7-20)


 


Creatinine


 


 


 0.6 mg/dL


(0.6-1.0)


 


Estimated GFR


(Cockcroft-Gault) 


 


 109.1 





 


Glucose Level


 


 


 92 mg/dL


(70-99)


 


Calcium Level


 


 


 7.9 mg/dL


(8.5-10.1)


 


Magnesium Level


 


 


 2.0 mg/dL


(1.8-2.4)


 


Ferritin


 


 


 5 ng/mL


(8-252)


 


Thyroid Stimulating Hormone


(TSH) 


 


 0.013 uIU/mL


(0.358-3.74)











Physical Exam


HEENT:  Neck Supple W Full Motion


Chest:  Symmetric


LUNGS:  Clear to Auscultation


Heart:  RRR (SR nofurther ectopies)


Abdomen:  Soft N/T


Extremities:  No Calf Tenderness


Neurology:  alert, oriented, follow commands





Assessment


Assessment


1.  Chest pain, palpitations in setting of #2. Resolved 


2.  PSVT; converted back to SR with adenosine. EF and WM nml None further


3.  Hypokalemia; replaced 


4.  Hypothyroidism; on replacement. TSH supratherapeutic level adjustment per 

PCP


5 . ADHD; on Adderall 


6. Tobaccoism





Recommendations


1. Continue metoprolol. Replace K


2. Consider alternative to adderral


3. Outpatient referral to EP


4. Follow up in office as scheduled


5. Smoking cessation





Justicifation of Admission Dx:


Justifications for Admission:


Justification of Admission Dx:  Yes





CRISTEL CHO MD 2/26/21 2246:


CARDIO Progress Notes


Plan


Plan


Pt. seen and examined. AGree with above NP note. 


Plan for outpt ischemic eval, event monitor and EP referral.











ANAYELI PANDA APRN          Feb 26, 2021 14:41


CRISTEL CHO MD       Feb 26, 2021 22:46

## 2021-02-26 NOTE — DISCH
DISCHARGE INSTRUCTIONS


Condition on Discharge


Condition on Discharge:  Stable (You will need to follow-up with her PCP because

your Synthroid has been adjusted from 75 mcg to 50 mcg/day)





Activity After Discharge


Activity Instructions for Disc:  Activity as tolerated


Exercise Instruction after Dis:  Walk 15 min, 3 x per day


Driving Instructions after Dis:  Do not drive today





Checks after Discharge


Checks after discharge:  Check blood press - daily, Weigh Yourself Daily





Contacting the DR. after DC


Call your doctor for:  If your condition worsens





Follow-Up


Follow up with:  PCP within 2 weeks of discharge


Follow Up With:  Cardiology as needed or scheduled











BIANCA HERNANDEZ MD                    Feb 26, 2021 13:17

## 2021-02-26 NOTE — NUR
Discharge Note:



WERO STANFORD [facility]



Discharge instructions and discharge home medications reviewed with Patient and a copy 
given. All questions have been answered and understanding verbalized. 



The following instructions and handouts were given: SVT 



Discontinued lines and drains: Peripheral IV intact.



Patient discharged to Home or Self Care with Self via Ambulated

## 2021-02-26 NOTE — NUR
SS following for discharge planning. SS reviewed pt chart and discussed with pt RN. Pt is 
from home with spouse and is currently on room air. Cardiology following. Discharge plan is 
to home when medically ready. SS will continue to follow for discharge planning.

## 2021-03-03 NOTE — PDOC3
[facility]-Discharge Summary


Date of Admission:


Date of Admission:  Feb 25, 2021





Date of Discharge:


Date of Discharge:  Feb 26, 2021





Discharge Diagnosis:


Discharge Diagnosis:


1.  Chest pain, palpitations in setting of #2. Resolved 


2.  PSVT; converted back to SR with adenosine. EF and WM nml None further


3.  Hypokalemia; replaced 


4.  Hypothyroidism; on replacement. TSH supratherapeutic level adjustment per 

PCP


5 . ADHD; on Adderall 


6. Tobaccoism





Hospital Course:


Hospital Course:


By day of discharge patient was chest pain free and her HR was controlled on 

metoprolol.





Recommendations


1. Continue metoprolol. Replace K


2. Consider alternative to adderral


3. Outpatient referral to EP


4. Follow up in office as scheduled


5. Smoking cessation





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


works as an LPN.  She presented with palpitations, was noted to be in SVT. 


While in the ER, we also noticed that she has got hypokalemia.  We gave her 2


doses of adenosine that eventually work.  It should be noted that she is also


amphetamine/methamphetamine positive on drug screen.





Disposition:


Disposition/Orders:  D/C to Home





Activity:


Activity:


Resume previous activity





Diet:


Diet:  Cardiac





Medications:


Home Meds


Active Scripts


Levothyroxine Sodium (LEVOTHYROXINE SODIUM) 75 Mcg Tablet, 50 MCG PO DAILYAC for

THYROID SUPPLEMENT for 30 Days, #20 TAB 0 Refills


   Prov:BIANCA HERNANDEZ MD         2/26/21


Metoprolol Succinate (METOPROLOL SUCCINATE ( XL )) 25 Mg Tab.er.24h, 25 MG PO 

DAILY for heart rate for 30 Days, #30 TAB.SR


   Prov:BIANCA HERNANDEZ MD         2/26/21


Reported Medications


Montelukast Sodium (SINGULAIR TABLET **) 10 Mg Tablet, 10 MG PO DAILY for FOR 

ASTHMA, TAB 0 Refills


   2/25/21


Fluoxetine Hcl (FLUOXETINE HCL) 20 Mg Capsule, 1 CAP PO DAILY for  , #90 CAP 1 

Refill


   2/25/21


Discontinued Reported Medications


Tramadol Hcl (ULTRAM) 50 Mg Tablet, 1-2 TAB PO BID PRN for pain MDD 2 Tablet(s) 

for 30 Days, #60 TAB 0 Refills


   2/25/21


Dextroamphetamine/Amphetamine (ADDERALL 20 MG TABLET) 20 Mg Tablet, 1 TAB PO BID

for   MDD 2 Tablet(s) for 5 Days, #10 TAB 0 Refills


   2/25/21


Scheduled


Fluoxetine Hcl (Fluoxetine Hcl), 1 CAP PO DAILY, (Reported)


Levothyroxine Sodium (Levothyroxine Sodium), 50 MCG PO DAILYAC


Metoprolol Succinate (Metoprolol Succinate ( Xl )), 25 MG PO DAILY


Montelukast Sodium (Singulair Tablet **), 10 MG PO DAILY, (Reported)





Discontinued Medications


Dextroamphetamine/Amphetamine (Adderall 20 Mg Tablet), 1 TAB PO BID, (Reported)


Tramadol Hcl (Ultram), 1-2 TAB PO BID PRN for pain, (Reported)





Total Time:


Total Time:


Total time spent was  32  minutes in preparing scripts, discharge planning with 

SW and RN, and preparing this discharge summary. 








Patient seen and examined on day of discharge.





Justicifation of Admission Dx:


Justifications for Admission:


Justification of Admission Dx:  Yes











BIANCA HERNANDEZ MD                     Mar 3, 2021 07:46

## 2021-04-13 ENCOUNTER — HOSPITAL ENCOUNTER (EMERGENCY)
Dept: HOSPITAL 61 - ER | Age: 41
Discharge: HOME | End: 2021-04-13
Payer: COMMERCIAL

## 2021-04-13 VITALS — BODY MASS INDEX: 26.57 KG/M2 | HEIGHT: 66 IN | WEIGHT: 165.35 LBS

## 2021-04-13 VITALS — DIASTOLIC BLOOD PRESSURE: 82 MMHG | SYSTOLIC BLOOD PRESSURE: 116 MMHG

## 2021-04-13 DIAGNOSIS — I47.1: Primary | ICD-10-CM

## 2021-04-13 DIAGNOSIS — F31.9: ICD-10-CM

## 2021-04-13 DIAGNOSIS — E03.9: ICD-10-CM

## 2021-04-13 DIAGNOSIS — F41.9: ICD-10-CM

## 2021-04-13 DIAGNOSIS — F17.200: ICD-10-CM

## 2021-04-13 LAB
ALBUMIN SERPL-MCNC: 3.3 G/DL (ref 3.4–5)
ALBUMIN/GLOB SERPL: 0.9 {RATIO} (ref 1–1.7)
ALP SERPL-CCNC: 86 U/L (ref 46–116)
ALT SERPL-CCNC: 14 U/L (ref 14–59)
ANION GAP SERPL CALC-SCNC: 11 MMOL/L (ref 6–14)
ANISOCYTOSIS BLD QL SMEAR: (no result)
AST SERPL-CCNC: 13 U/L (ref 15–37)
BASOPHILS # BLD AUTO: 0.1 X10^3/UL (ref 0–0.2)
BASOPHILS NFR BLD: 1 % (ref 0–3)
BILIRUB SERPL-MCNC: 0.1 MG/DL (ref 0.2–1)
BIZZARE CELLS: (no result)
BUN SERPL-MCNC: 15 MG/DL (ref 7–20)
BUN/CREAT SERPL: 19 (ref 6–20)
BURR CELLS BLD QL SMEAR: (no result)
CALCIUM SERPL-MCNC: 8.6 MG/DL (ref 8.5–10.1)
CHLORIDE SERPL-SCNC: 104 MMOL/L (ref 98–107)
CO2 SERPL-SCNC: 23 MMOL/L (ref 21–32)
CREAT SERPL-MCNC: 0.8 MG/DL (ref 0.6–1)
EOSINOPHIL NFR BLD: 0.3 X10^3/UL (ref 0–0.7)
EOSINOPHIL NFR BLD: 3 % (ref 0–3)
ERYTHROCYTE [DISTWIDTH] IN BLOOD BY AUTOMATED COUNT: 21.4 % (ref 11.5–14.5)
GFR SERPLBLD BASED ON 1.73 SQ M-ARVRAT: 78.3 ML/MIN
GLUCOSE SERPL-MCNC: 150 MG/DL (ref 70–99)
HCT VFR BLD CALC: 34.9 % (ref 36–47)
HGB BLD-MCNC: 10.7 G/DL (ref 12–15.5)
HYPOCHROMIA BLD QL SMEAR: (no result)
LYMPHOCYTES # BLD: 2.1 X10^3/UL (ref 1–4.8)
LYMPHOCYTES NFR BLD AUTO: 22 % (ref 24–48)
MCH RBC QN AUTO: 21 PG (ref 25–35)
MCHC RBC AUTO-ENTMCNC: 31 G/DL (ref 31–37)
MCV RBC AUTO: 69 FL (ref 79–100)
MICROCYTES BLD QL SMEAR: (no result)
MONO #: 1.5 X10^3/UL (ref 0–1.1)
MONOCYTES NFR BLD: 16 % (ref 0–9)
NEUT #: 5.4 X10^3/UL (ref 1.8–7.7)
NEUTROPHILS NFR BLD AUTO: 58 % (ref 31–73)
OVALOCYTES BLD QL SMEAR: (no result)
PLATELET # BLD AUTO: 452 X10^3/UL (ref 140–400)
PLATELET # BLD EST: (no result) 10*3/UL
POIKILOCYTOSIS BLD QL SMEAR: SLIGHT
POLYCHROMASIA BLD QL SMEAR: SLIGHT
POTASSIUM SERPL-SCNC: 4.5 MMOL/L (ref 3.5–5.1)
PROT SERPL-MCNC: 7.1 G/DL (ref 6.4–8.2)
RBC # BLD AUTO: 5.09 X10^6/UL (ref 3.5–5.4)
SCHISTOCYTES BLD QL SMEAR: (no result)
SODIUM SERPL-SCNC: 138 MMOL/L (ref 136–145)
TARGETS BLD QL SMEAR: (no result)
WBC # BLD AUTO: 9.3 X10^3/UL (ref 4–11)

## 2021-04-13 PROCEDURE — 85025 COMPLETE CBC W/AUTO DIFF WBC: CPT

## 2021-04-13 PROCEDURE — 84484 ASSAY OF TROPONIN QUANT: CPT

## 2021-04-13 PROCEDURE — 83735 ASSAY OF MAGNESIUM: CPT

## 2021-04-13 PROCEDURE — 71045 X-RAY EXAM CHEST 1 VIEW: CPT

## 2021-04-13 PROCEDURE — 80053 COMPREHEN METABOLIC PANEL: CPT

## 2021-04-13 PROCEDURE — 93005 ELECTROCARDIOGRAM TRACING: CPT

## 2021-04-13 PROCEDURE — 83880 ASSAY OF NATRIURETIC PEPTIDE: CPT

## 2021-04-13 PROCEDURE — 36415 COLL VENOUS BLD VENIPUNCTURE: CPT

## 2021-04-13 NOTE — ED.ADGEN
Past Medical History


Past Medical History:  Anxiety, Bipolar, Depression, Hypothyroid, Other


Additional Past Medical Histor:  SVT


Past Surgical History:  No Surgical History


Smoking Status:  Current Every Day Smoker


Alcohol Use:  None





General Adult


EDM:


Chief Complaint:  RAPID HEART RATE





HPI:


HPI:


Patient 41-year-old female presents to the emergency room complaining of severe 

chest pressure, shortness of breath, and feeling like she is dying.  Patient has

a history of SVT and states that this feeling started about an hour ago.  She is

due to see the cardiologist on Friday.  She denies any syncopal episodes.  She 

last had an episode of this in February.  She has tried maneuvers at home 

without relief.  She did miss her medication this morning.





Review of Systems:


Review of Systems:


Complete ROS is negative unless otherwise documented in HPI





Current Medications:





Current Medications








 Medications


  (Trade)  Dose


 Ordered  Sig/Kwame  Start Time


 Stop Time Status Last Admin


Dose Admin


 


 Adenosine


  (Adenocard)  6 mg  STK-MED ONCE  4/13/21 16:27


 4/13/21 16:27 DC  





 


 Metoprolol


 Tartrate


  (Lopressor)  50 mg  1X  ONCE  4/13/21 17:45


 4/13/21 17:46 DC 4/13/21 18:03


50 MG











Allergies:


Allergies:





Allergies








Coded Allergies Type Severity Reaction Last Updated Verified


 


  No Known Drug Allergies    2/25/21 No











Physical Exam:


PE:


General: Awake, alert, moderate distress. Well Nourished, well hydrated. 

Cooperative


HEENT: Atraumatic, EOMI, PERRL, airway patent, moist oral mucosa


Neck: Supple, trachea midline


Respiratory: CTA bilaterally, normal effort, no wheezing/crackles


CV: Tachycardia, no murmur, cap refill 3 seconds 


GI: Soft, nondistended, nontender, no masses


MSK: No obvious deformities


Skin: Warm, dry, intact


Neuro: A&O x3, speech NL, sensory and motor grossly intact, no focal deficits


Psych: Anxious, not suicidal or homicidal





Current Patient Data:


Labs:





                                Laboratory Tests








Test


 4/13/21


16:18


 


White Blood Count


 9.3 x10^3/uL


(4.0-11.0)


 


Red Blood Count


 5.09 x10^6/uL


(3.50-5.40)


 


Hemoglobin


 10.7 g/dL


(12.0-15.5)  L


 


Hematocrit


 34.9 %


(36.0-47.0)  L


 


Mean Corpuscular Volume


 69 fL ()


L


 


Mean Corpuscular Hemoglobin


 21 pg (25-35)


L


 


Mean Corpuscular Hemoglobin


Concent 31 g/dL


(31-37)


 


Red Cell Distribution Width


 21.4 %


(11.5-14.5)  H


 


Platelet Count


 452 x10^3/uL


(140-400)  H


 


Neutrophils (%) (Auto) 58 % (31-73)  


 


Lymphocytes (%) (Auto) 22 % (24-48)  L


 


Monocytes (%) (Auto) 16 % (0-9)  H


 


Eosinophils (%) (Auto) 3 % (0-3)  


 


Basophils (%) (Auto) 1 % (0-3)  


 


Neutrophils # (Auto)


 5.4 x10^3/uL


(1.8-7.7)


 


Lymphocytes # (Auto)


 2.1 x10^3/uL


(1.0-4.8)


 


Monocytes # (Auto)


 1.5 x10^3/uL


(0.0-1.1)  H


 


Eosinophils # (Auto)


 0.3 x10^3/uL


(0.0-0.7)


 


Basophils # (Auto)


 0.1 x10^3/uL


(0.0-0.2)


 


Platelet Estimate


 Increased


(ADEQUATE)


 


Large Platelets Present  


 


Polychromasia Slight  


 


Hypochromasia Mod  


 


Poikilocytosis Slight  


 


Anisocytosis Mod  


 


Microcytosis Marked  


 


Target Cells Occ  


 


Ovalocytes Few  


 


Roundhill Cells Occ  


 


Schistocytes Occ  


 


RBC Morphology Bizarre Forms Occ  


 


Sodium Level


 138 mmol/L


(136-145)


 


Potassium Level


 4.5 mmol/L


(3.5-5.1)


 


Chloride Level


 104 mmol/L


()


 


Carbon Dioxide Level


 23 mmol/L


(21-32)


 


Anion Gap 11 (6-14)  


 


Blood Urea Nitrogen


 15 mg/dL


(7-20)


 


Creatinine


 0.8 mg/dL


(0.6-1.0)


 


Estimated GFR


(Cockcroft-Gault) 78.3  





 


BUN/Creatinine Ratio 19 (6-20)  


 


Glucose Level


 150 mg/dL


(70-99)  H


 


Calcium Level


 8.6 mg/dL


(8.5-10.1)


 


Magnesium Level


 2.1 mg/dL


(1.8-2.4)


 


Total Bilirubin


 0.1 mg/dL


(0.2-1.0)  L


 


Aspartate Amino Transferase


(AST) 13 U/L (15-37)


L


 


Alanine Aminotransferase (ALT)


 14 U/L (14-59)





 


Alkaline Phosphatase


 86 U/L


()


 


Troponin I Quantitative


 < 0.017 ng/mL


(0.000-0.055)


 


NT-Pro-B-Type Natriuretic


Peptide 1883 pg/mL


(0-124)  H


 


Total Protein


 7.1 g/dL


(6.4-8.2)


 


Albumin


 3.3 g/dL


(3.4-5.0)  L


 


Albumin/Globulin Ratio


 0.9 (1.0-1.7)


L





                                Laboratory Tests


4/13/21 16:18








                                Laboratory Tests


4/13/21 16:18











Vital Signs:





                                   Vital Signs








  Date Time  Temp Pulse Resp B/P (MAP) Pulse Ox O2 Delivery O2 Flow Rate FiO2


 


4/13/21 18:03  105  116/82    


 


4/13/21 17:09     100 Room Air  


 


4/13/21 16:18 98.1  28     





 98.1       











EKG:


EKG:


[]





Heart Score:


C/O Chest Pain:  N/A


Risk Factors:


Risk Factors:  DM, Current or recent (<one month) smoker, HTN, HLP, family 

history of CAD, obesity.


Risk Scores:


Score 0 - 3:  2.5% MACE over next 6 weeks - Discharge Home


Score 4 - 6:  20.3% MACE over next 6 weeks - Admit for Clinical Observation


Score 7 - 10:  72.7% MACE over next 6 weeks - Early Invasive Strategies





Radiology/Procedures:


Radiology/Procedures:


[]





Course & Med Decision Making:


Course & Med Decision Making


Pertinent Labs and Imaging studies reviewed. (See chart for details)





Patient is a 41-year-old female with a past medical history of SVT who rpesents 

to the Emergency Room complaining of palpitations, chest pressure. Upon arrival,

 EKG was performed and shows the patient is in SVT. Patient does have a history 

of SVT. On exam, patient appears anxious, moderate distress, heart rate of 240. 

Patient was given adenosine x2 and on re-evaluation patient is in sinus tachycar

sofia with a heart rate of 105. CBC, BMP, BNP, troponin, EKG, and CXR were ordered

 to evaluate for causes of arrhythmia and to evaluate for end stage organ 

damage.  Lab work is unremarkable.  I did recommend admission and patient was 

seen by Dr. Daly.  Patient has a follow-up appointment on Friday.  She would 

like to go home.  She will return to the emergency room if she goes back into 

SVT.  Patient's test results and vitals while in the ED were fully reviewed and 

discussed with the patient. Patient is stable and at this time does not need 

admission to the hospital. We have discussed strict return precautions and the 

importance of following up with their Primary Care Physician. Patient stated 

understanding and was given an opportunity to ask any questions. Patient is in 

agreement with plan.





Dragon Disclaimer:


Dragon Disclaimer:


This electronic medical record was generated, in whole or in part, using a voice

 recognition dictation system.


Critical Care Time


Critical Care:


Authorized and Performed by: Jennifer Kaba MD


Total critical care time: approximately 35 minutes


Due to a high probability of clinically significant, life threatening 

deterioration, the patient required my highest level of preparedness to 

intervene emergently and I personally spent this critical care time directly and

 personally managing the patient. This critical care time included obtaining a 

history; examining the patient; pulse oximetry; ventilator management if 

necessary; ordering and review of studies; arranging urgent treatment with 

development of a management plan; evaluation of patient's response to treatment;

 frequent reassessment; discussion with patient/family; and, discussions with 

other providers.


This critical care time was performed to assess and manage the high probability 

of imminent, life-threatening deterioration that could result in multi-organ 

failure. It was exclusive of separately billable procedures and treating other 

patients and teaching time.


Please see MDM section and the rest of the note for further information on 

patient assessment and treatment.





Departure


Departure


Impression:  


   Primary Impression:  


   SVT (supraventricular tachycardia)


Disposition:  01 HOME / SELF CARE / HOMELESS


Condition:  IMPROVED


Referrals:  


CINDY STANFORD MD (PCP)


Patient Instructions:  Supraventricular Tachycardia











JENNIFER KABA MD            Apr 13, 2021 17:12

## 2021-04-13 NOTE — EKG
[facility]

              [address] [place] [address]

Test Date:    2021               Test Time:    16:29:57

Pat Name:     REI STANFORD           Department:   

Patient ID:   [id number]           Room:          

Gender:       F                        Technician:   

:          [id number]               Requested By: JENNIFER WRIGHT

Order Number: [id number]           Reading MD:     

                                 Measurements

Intervals                              Axis          

Rate:         85                       P:            52

OH:           128                      QRS:          71

QRSD:         90                       T:            56

QT:           310                                    

QTc:          373                                    

                           Interpretive Statements

SINUS RHYTHM

LVH WITH REPOLARIZATION ABNORMALITY

ABNORMAL ECG

RI6.02

Compared to ECG 2021 16:23:19

Supraventricular tachycardia no longer present

## 2021-04-13 NOTE — PDOC1
History and Physical


Date of Admission


Date of Admission


DATE: 4/13/21 


TIME: 17:17





Identification/Chief Complaint


Chief Complaint


Palpitations





Source


Source:  Patient





History of Present Illness


History of Present Illness


Ms Tran is a 40yo F w/ PMHx Anxiety, Bipolar, Depression, Hypothyroidism, SVT,

smoker who presents to ED with her  due to palpitations. She contacted 

her  earlier in the day, he rushed home and noted her HR on pulse 

oximetry was 240bpm and he brought her to ED.


On telemetry she was noted to be in SVT in the 240s and was responsive after 2 

doses of 12 mg of adenosine.  She did have a mild headache but improved after 

she went to the restroom.  After discussion with her and her  bedside she

has follow-up with Dr. Villanueva on 4/16/2021.


BNP elevated in the low 1000s.  Otherwise appears to have a microcytic anemia.  

Chest radiograph with no acute abnormalities.


She does note she still takes Adderall and occasionally smokes but has cut down 

her levothyroxine dosing to 50 mcg daily and also has cut out caffeine.


She notes that she did forget to take her metoprolol today.


Called by ED physician for consultation in the ED for consideration for 

admission





Past Medical History


Cardiovascular:  Other


Psych:  Depression, Other


Endocrine:  Hypothyroidism





Past Surgical History


Past Surgical History:  No pertinent history





Family History


Family History:  Heart Disease, Hypothyroidism





Social History


Smoke:  <1 pack per day


ALCOHOL:  none


Drugs:  None





Current Problem List


Problem List


Problems


Medical Problems:


(1) SVT (supraventricular tachycardia)


Status: Acute  











Current Medications


Current Medications





Current Medications


Adenosine (Adenocard) 6 mg STK-MED ONCE IV ;  Start 4/13/21 at 16:21;  Stop 

4/13/21 at 16:22;  Status DC


Adenosine (Adenocard) 6 mg STK-MED ONCE IV ;  Start 4/13/21 at 16:27;  Stop 

4/13/21 at 16:27;  Status DC





Active Scripts


Active


Levothyroxine Sodium 75 Mcg Tablet 50 Mcg PO DAILYAC 30 Days


Metoprolol Succinate ( Xl ) (Metoprolol Succinate) 25 Mg Tab.er.24h 25 Mg PO 

DAILY 30 Days


Reported


Singulair Tablet ** (Montelukast Sodium) 10 Mg Tablet 10 Mg PO DAILY


Fluoxetine Hcl 20 Mg Capsule 1 Cap PO DAILY





Allergies


Allergies:  


Coded Allergies:  


     No Known Drug Allergies (Unverified , 2/25/21)





ROS


General:  YES: Fatigue, Malaise; 


   No: Chills, Night Sweats, Appetite, Other


PSYCHOLOGICAL ROS:  YES: Anxiety; 


   No: Behavioral Disorder, Concentration difficultie, Decreased libido, 

Depression, Disorientation, Hallucinations, Hostility, Irritablity, Memory 

difficulties, Mood Swings, Obsessive thoughts, Physical abuse, Sexual abuse, 

Sleep disturbances, Suicidal ideation, Other


Eyes:  No Blurry vision, No Decreased vision, No Double vision, No Dry eyes, No 

Excessive tearing, No Eye Pain, No Itchy Eyes, No Loss of vision, No 

Photophobia, No Scotomata, No Uses contacts, No Uses glasses, No Other


HEENT:  No: Heacaches, Visual Changes, Hearing change, Nasal congestion, Nasal 

discharge, Oral lesions, Sinus pain, Sore Throat, Epistaxis, Sneezing, Snoring, 

Tinnitus, Vertigo, Vocal changes, Other


ALLERGY AND IMMUNOLOGY:  No: Hives, Insect Bite Sensitivity, Itchy/Watery Eyes, 

Nasal Congestion, Post Nasal Drip, Seasonal Allergies, Other


Hematological and Lymphatic:  No: Bleeding Problems, Blood Clots, Blood 

Transfusions, Brusing, Night Sweats, Pallor, Swollen Lymph Nodes, Other


ENDOCRINE:  No: Breast Changes, Galactorrhea, Hair Pattern Changes, Hot Flashes,

Malaise/lethargy, Mood Swings, Palpitations, Polydipsia/polyuria, Skin Changes, 

Temperature Intolerance, Unexpected Weight Changes, Other


Breast:  No New/Changing Breast Lumps, No Nipple changes, No Nipple discharge, 

No Other


Respiratory:  No: Cough, Hemoptysis, Orthopnea, Pleuritic Pain, Shortness of 

breath, SOB with excertion, Sputum Changes, Stridor, Tachypnea, Wheezing, Other


Cardiovascular:  No Chest Pain, No Palpitations, No Orthopnea, No Paroxysmal 

Noc. Dyspnea, No Edema, No Lt Headedness, No Other


Gastrointestinal:  No Nausea, No Vomiting, No Abdominal Pain, No Diarrhea, No 

Constipation, No Melena, No Hematochezia, No Other


Genitourinary:  No Dysuria, No Frequency, No Incontinence, No Hematuria, No 

Retention, No Discharge, No Urgency, No Pain, No Flank Pain, No Other, No , No ,

No , No , No , No , No 


Musculoskeletal:  No Gait Disturbance, No Joint Pain, No Joint Stiffness, No 

Joint Swelling, No Muscle Pain, No Muscular Weakness, No Pain In:, No Swelling 

In:, No Other


Neurological:  No Behavorial Changes, No Bowel/Bladder ControlChng, No 

Confusion, No Dizziness, No Gait Disturbance, No Headaches, No Impaired 

Coord/balance, No Memory Loss, No Numbness/Tingling, No Seizures, No Speech 

Problems, No Tremors, No Visual Changes, No Weakness, No Other


Skin:  No Dry Skin, No Eczema, No Hair Changes, No Lumps, No Mole Changes, No 

Mottling, No Nail Changes, No Pruritus, No Rash, No Skin Lesion Changes, No 

Other, No Acne





Physical Exam


General:  Alert, Oriented X3, Cooperative, No acute distress


HEENT:  Atraumatic, PERRLA, EOMI, Mucous membr. moist/pink


Lungs:  Clear to auscultation, Normal air movement


Heart:  S1S2, RRR, no thrills, no rubs, no gallops, no murmurs


Abdomen:  Normal bowel sounds, Soft, No tenderness, No hepatosplenomegaly, No 

masses


Rectal Exam:  not examined


Extremities:  No clubbing, No cyanosis, No edema, Normal pulses, No 

tenderness/swelling


Skin:  No rashes, No breakdown, No significant lesion


Neuro:  Normal gait, Normal speech, Strength at 5/5 X4 ext, Normal tone, 

Sensation intact, Cranial nerves 3-12 NL, Reflexes 2+


Psych/Mental Status:  Mental status NL, Mood NL





Vitals


Vitals





Vital Signs








  Date Time  Temp Pulse Resp B/P (MAP) Pulse Ox O2 Delivery O2 Flow Rate FiO2


 


4/13/21 16:18 98.1 240 28 103/69 (80) 100 Room Air  





 98.1       











Labs


Labs





Laboratory Tests








Test


 4/13/21


16:18


 


White Blood Count


 9.3 x10^3/uL


(4.0-11.0)


 


Red Blood Count


 5.09 x10^6/uL


(3.50-5.40)


 


Hemoglobin


 10.7 g/dL


(12.0-15.5)


 


Hematocrit


 34.9 %


(36.0-47.0)


 


Mean Corpuscular Volume 69 fL () 


 


Mean Corpuscular Hemoglobin 21 pg (25-35) 


 


Mean Corpuscular Hemoglobin


Concent 31 g/dL


(31-37)


 


Red Cell Distribution Width


 21.4 %


(11.5-14.5)


 


Platelet Count


 452 x10^3/uL


(140-400)


 


Neutrophils (%) (Auto) 58 % (31-73) 


 


Lymphocytes (%) (Auto) 22 % (24-48) 


 


Monocytes (%) (Auto) 16 % (0-9) 


 


Eosinophils (%) (Auto) 3 % (0-3) 


 


Basophils (%) (Auto) 1 % (0-3) 


 


Neutrophils # (Auto)


 5.4 x10^3/uL


(1.8-7.7)


 


Lymphocytes # (Auto)


 2.1 x10^3/uL


(1.0-4.8)


 


Monocytes # (Auto)


 1.5 x10^3/uL


(0.0-1.1)


 


Eosinophils # (Auto)


 0.3 x10^3/uL


(0.0-0.7)


 


Basophils # (Auto)


 0.1 x10^3/uL


(0.0-0.2)


 


Sodium Level


 138 mmol/L


(136-145)


 


Potassium Level


 4.5 mmol/L


(3.5-5.1)


 


Chloride Level


 104 mmol/L


()


 


Carbon Dioxide Level


 23 mmol/L


(21-32)


 


Anion Gap 11 (6-14) 


 


Blood Urea Nitrogen


 15 mg/dL


(7-20)


 


Creatinine


 0.8 mg/dL


(0.6-1.0)


 


Estimated GFR


(Cockcroft-Gault) 78.3 





 


BUN/Creatinine Ratio 19 (6-20) 


 


Glucose Level


 150 mg/dL


(70-99)


 


Calcium Level


 8.6 mg/dL


(8.5-10.1)








Laboratory Tests








Test


 4/13/21


16:18


 


White Blood Count


 9.3 x10^3/uL


(4.0-11.0)


 


Red Blood Count


 5.09 x10^6/uL


(3.50-5.40)


 


Hemoglobin


 10.7 g/dL


(12.0-15.5)


 


Hematocrit


 34.9 %


(36.0-47.0)


 


Mean Corpuscular Volume 69 fL () 


 


Mean Corpuscular Hemoglobin 21 pg (25-35) 


 


Mean Corpuscular Hemoglobin


Concent 31 g/dL


(31-37)


 


Red Cell Distribution Width


 21.4 %


(11.5-14.5)


 


Platelet Count


 452 x10^3/uL


(140-400)


 


Neutrophils (%) (Auto) 58 % (31-73) 


 


Lymphocytes (%) (Auto) 22 % (24-48) 


 


Monocytes (%) (Auto) 16 % (0-9) 


 


Eosinophils (%) (Auto) 3 % (0-3) 


 


Basophils (%) (Auto) 1 % (0-3) 


 


Neutrophils # (Auto)


 5.4 x10^3/uL


(1.8-7.7)


 


Lymphocytes # (Auto)


 2.1 x10^3/uL


(1.0-4.8)


 


Monocytes # (Auto)


 1.5 x10^3/uL


(0.0-1.1)


 


Eosinophils # (Auto)


 0.3 x10^3/uL


(0.0-0.7)


 


Basophils # (Auto)


 0.1 x10^3/uL


(0.0-0.2)


 


Sodium Level


 138 mmol/L


(136-145)


 


Potassium Level


 4.5 mmol/L


(3.5-5.1)


 


Chloride Level


 104 mmol/L


()


 


Carbon Dioxide Level


 23 mmol/L


(21-32)


 


Anion Gap 11 (6-14) 


 


Blood Urea Nitrogen


 15 mg/dL


(7-20)


 


Creatinine


 0.8 mg/dL


(0.6-1.0)


 


Estimated GFR


(Cockcroft-Gault) 78.3 





 


BUN/Creatinine Ratio 19 (6-20) 


 


Glucose Level


 150 mg/dL


(70-99)


 


Calcium Level


 8.6 mg/dL


(8.5-10.1)











Images


Images


Chest Radiograph:


The cardiomediastinal silhouette is within normal limits. Lungs are clear.


There are no significant pleural effusions. There is no pulmonary vascular 

congestion. No pneumothorax.


No suspicious osseous abnormality.





IMPRESSION:


There is no acute cardiopulmonary process.





VTE Prophylaxis Ordered


VTE Prophylaxis Devices:  No


VTE Pharmacological Prophylaxi:  No





Assessment/Plan


Assessment/Plan


A/P:


SVT - likely due to missed dosing of metoprolol.  Will give a dose of 50 mg x 1 

of tartrate this evening and she will get back on her metoprolol succinate 50 mg

daily for 14 in the morning.


Chest pain - Resolved after adenosine


Hypothyroidism; on replacement, dosing appropriately recently decreased


ADHD; on Adderall, advised to change to Strattera or modafinil.  She is 

previously tried Concerta and had seizures when she was on Wellbutrin.


Smoker -counseled on cessation.  She is cutting back and has plans to Quit 

completely





FEN - General diet


PPX - ambulatory


CODE - FULL


Dispo -after review of labs and telemetry strips knowing she has cardiology f

ollow-up and medications I feel it is safe and appropriate for discharge home 

with self care.  Discussed the plan with her and her  bedside she will 

return if symptoms recur and will be agreeable to admission if that does happen 

otherwise has follow-up on 4/16/2021





Justifications for Admission


Other Justification














JUDITH OVALLE MD        Apr 13, 2021 17:19

## 2021-04-13 NOTE — RAD
XR CHEST 1V 4/13/2021 5:04 PM



INDICATION: Shortness of breath



COMPARISON: February 25, 2021



TECHNIQUE: Portable frontal view of the chest is provided.



FINDINGS:



The cardiomediastinal silhouette is within normal limits. Lungs are clear.



There are no significant pleural effusions. There is no pulmonary vascular congestion. No pneumothora
x.



No suspicious osseous abnormality.



IMPRESSION:



There is no acute cardiopulmonary process.



Electronically signed by: Tami Singh MD (4/13/2021 5:11 PM) [id number]

## 2021-10-25 ENCOUNTER — HOSPITAL ENCOUNTER (INPATIENT)
Dept: HOSPITAL 61 - ER | Age: 41
LOS: 1 days | Discharge: ANOTHER HOSPITAL | DRG: 314 | End: 2021-10-26
Attending: INTERNAL MEDICINE | Admitting: INTERNAL MEDICINE
Payer: COMMERCIAL

## 2021-10-25 VITALS — BODY MASS INDEX: 27.99 KG/M2 | HEIGHT: 66 IN | WEIGHT: 174.17 LBS

## 2021-10-25 VITALS — DIASTOLIC BLOOD PRESSURE: 54 MMHG | SYSTOLIC BLOOD PRESSURE: 84 MMHG

## 2021-10-25 VITALS — SYSTOLIC BLOOD PRESSURE: 77 MMHG | DIASTOLIC BLOOD PRESSURE: 52 MMHG

## 2021-10-25 VITALS — SYSTOLIC BLOOD PRESSURE: 96 MMHG | DIASTOLIC BLOOD PRESSURE: 55 MMHG

## 2021-10-25 VITALS — DIASTOLIC BLOOD PRESSURE: 52 MMHG | SYSTOLIC BLOOD PRESSURE: 85 MMHG

## 2021-10-25 DIAGNOSIS — F90.9: ICD-10-CM

## 2021-10-25 DIAGNOSIS — Z20.822: ICD-10-CM
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
## 2021-10-25 DIAGNOSIS — E03.9: ICD-10-CM

## 2021-10-25 DIAGNOSIS — E87.1: ICD-10-CM

## 2021-10-25 DIAGNOSIS — B96.89: ICD-10-CM

## 2021-10-25 DIAGNOSIS — D64.9: ICD-10-CM

## 2021-10-25 DIAGNOSIS — Z95.0: ICD-10-CM

## 2021-10-25 DIAGNOSIS — F41.9: ICD-10-CM

## 2021-10-25 DIAGNOSIS — F17.210: ICD-10-CM

## 2021-10-25 DIAGNOSIS — J45.909: ICD-10-CM

## 2021-10-25 DIAGNOSIS — A41.89: ICD-10-CM

## 2021-10-25 DIAGNOSIS — N17.9: ICD-10-CM

## 2021-10-25 DIAGNOSIS — F31.9: ICD-10-CM

## 2021-10-25 DIAGNOSIS — T82.7XXA: Primary | ICD-10-CM

## 2021-10-25 DIAGNOSIS — E46: ICD-10-CM

## 2021-10-25 LAB
% BANDS: 41 % (ref 0–9)
% LYMPHS: 2 % (ref 24–48)
% MONOS: 1 % (ref 0–10)
% SEGS: 54 % (ref 35–66)
ALBUMIN SERPL-MCNC: 2.6 G/DL (ref 3.4–5)
ALBUMIN/GLOB SERPL: 0.7 {RATIO} (ref 1–1.7)
ALP SERPL-CCNC: 407 U/L (ref 46–116)
ALT SERPL-CCNC: 166 U/L (ref 14–59)
ANION GAP SERPL CALC-SCNC: 14 MMOL/L (ref 6–14)
ANISOCYTOSIS BLD QL SMEAR: (no result)
AST SERPL-CCNC: 190 U/L (ref 15–37)
BASOPHILS # BLD AUTO: 0 X10^3/UL (ref 0–0.2)
BASOPHILS NFR BLD: 0 % (ref 0–3)
BILIRUB SERPL-MCNC: 1.2 MG/DL (ref 0.2–1)
BIZZARE CELLS: PRESENT
BUN SERPL-MCNC: 27 MG/DL (ref 7–20)
BUN/CREAT SERPL: 17 (ref 6–20)
BURR CELLS BLD QL SMEAR: (no result)
CALCIUM SERPL-MCNC: 8 MG/DL (ref 8.5–10.1)
CHLORIDE SERPL-SCNC: 97 MMOL/L (ref 98–107)
CO2 SERPL-SCNC: 21 MMOL/L (ref 21–32)
CREAT SERPL-MCNC: 1.6 MG/DL (ref 0.6–1)
DACRYOCYTES BLD QL SMEAR: (no result)
EOSINOPHIL NFR BLD AUTO: 2 % (ref 0–5)
EOSINOPHIL NFR BLD: 0 X10^3/UL (ref 0–0.7)
EOSINOPHIL NFR BLD: 1 % (ref 0–3)
ERYTHROCYTE [DISTWIDTH] IN BLOOD BY AUTOMATED COUNT: 21.6 % (ref 11.5–14.5)
GFR SERPLBLD BASED ON 1.73 SQ M-ARVRAT: 35.2 ML/MIN
GLUCOSE SERPL-MCNC: 133 MG/DL (ref 70–99)
HCT VFR BLD CALC: 32.6 % (ref 36–47)
HGB BLD-MCNC: 10.6 G/DL (ref 12–15.5)
HYPOCHROMIA BLD QL SMEAR: (no result)
INFLUENZA A PATIENT: NEGATIVE
INFLUENZA B PATIENT: NEGATIVE
LYMPHOCYTES # BLD: 0.2 X10^3/UL (ref 1–4.8)
LYMPHOCYTES NFR BLD AUTO: 2 % (ref 24–48)
MCH RBC QN AUTO: 26 PG (ref 25–35)
MCHC RBC AUTO-ENTMCNC: 33 G/DL (ref 31–37)
MCV RBC AUTO: 80 FL (ref 79–100)
MICROCYTES BLD QL SMEAR: (no result)
MONO #: 0.2 X10^3/UL (ref 0–1.1)
MONOCYTES NFR BLD: 2 % (ref 0–9)
MONONUCLEOSIS PATIENT: NEGATIVE
NEUT #: 8 X10^3/UL (ref 1.8–7.7)
NEUTROPHILS NFR BLD AUTO: 94 % (ref 31–73)
OVALOCYTES BLD QL SMEAR: (no result)
PLATELET # BLD AUTO: 148 X10^3/UL (ref 140–400)
PLATELET # BLD EST: ADEQUATE 10*3/UL
POIKILOCYTOSIS BLD QL SMEAR: (no result)
POTASSIUM SERPL-SCNC: 4.3 MMOL/L (ref 3.5–5.1)
PROT SERPL-MCNC: 6.1 G/DL (ref 6.4–8.2)
RBC # BLD AUTO: 4.05 X10^6/UL (ref 3.5–5.4)
SCHISTOCYTES BLD QL SMEAR: (no result)
SODIUM SERPL-SCNC: 132 MMOL/L (ref 136–145)
TARGETS BLD QL SMEAR: (no result)
WBC # BLD AUTO: 8.5 X10^3/UL (ref 4–11)

## 2021-10-25 PROCEDURE — 96361 HYDRATE IV INFUSION ADD-ON: CPT

## 2021-10-25 PROCEDURE — 81001 URINALYSIS AUTO W/SCOPE: CPT

## 2021-10-25 PROCEDURE — 83540 ASSAY OF IRON: CPT

## 2021-10-25 PROCEDURE — 85025 COMPLETE CBC W/AUTO DIFF WBC: CPT

## 2021-10-25 PROCEDURE — 96360 HYDRATION IV INFUSION INIT: CPT

## 2021-10-25 PROCEDURE — 83550 IRON BINDING TEST: CPT

## 2021-10-25 PROCEDURE — 87070 CULTURE OTHR SPECIMN AEROBIC: CPT

## 2021-10-25 PROCEDURE — 80053 COMPREHEN METABOLIC PANEL: CPT

## 2021-10-25 PROCEDURE — 87205 SMEAR GRAM STAIN: CPT

## 2021-10-25 PROCEDURE — 87077 CULTURE AEROBIC IDENTIFY: CPT

## 2021-10-25 PROCEDURE — G0378 HOSPITAL OBSERVATION PER HR: HCPCS

## 2021-10-25 PROCEDURE — 36415 COLL VENOUS BLD VENIPUNCTURE: CPT

## 2021-10-25 PROCEDURE — 74176 CT ABD & PELVIS W/O CONTRAST: CPT

## 2021-10-25 PROCEDURE — 87186 SC STD MICRODIL/AGAR DIL: CPT

## 2021-10-25 PROCEDURE — 71045 X-RAY EXAM CHEST 1 VIEW: CPT

## 2021-10-25 PROCEDURE — 85007 BL SMEAR W/DIFF WBC COUNT: CPT

## 2021-10-25 PROCEDURE — 93005 ELECTROCARDIOGRAM TRACING: CPT

## 2021-10-25 PROCEDURE — 87426 SARSCOV CORONAVIRUS AG IA: CPT

## 2021-10-25 PROCEDURE — 87040 BLOOD CULTURE FOR BACTERIA: CPT

## 2021-10-25 PROCEDURE — 87880 STREP A ASSAY W/OPTIC: CPT

## 2021-10-25 PROCEDURE — 87804 INFLUENZA ASSAY W/OPTIC: CPT

## 2021-10-25 PROCEDURE — 76700 US EXAM ABDOM COMPLETE: CPT

## 2021-10-25 PROCEDURE — 86308 HETEROPHILE ANTIBODY SCREEN: CPT

## 2021-10-25 PROCEDURE — U0003 INFECTIOUS AGENT DETECTION BY NUCLEIC ACID (DNA OR RNA); SEVERE ACUTE RESPIRATORY SYNDROME CORONAVIRUS 2 (SARS-COV-2) (CORONAVIRUS DISEASE [COVID-19]), AMPLIFIED PROBE TECHNIQUE, MAKING USE OF HIGH THROUGHPUT TECHNOLOGIES AS DESCRIBED BY CMS-2020-01-R: HCPCS

## 2021-10-25 PROCEDURE — 83605 ASSAY OF LACTIC ACID: CPT

## 2021-10-25 RX ADMIN — PIPERACILLIN SODIUM AND TAZOBACTAM SODIUM SCH MLS/HR: 3; .375 INJECTION, POWDER, LYOPHILIZED, FOR SOLUTION INTRAVENOUS at 23:41

## 2021-10-25 RX ADMIN — BACITRACIN SCH MLS/HR: 5000 INJECTION, POWDER, FOR SOLUTION INTRAMUSCULAR at 16:15

## 2021-10-25 RX ADMIN — ACETAMINOPHEN PRN MG: 325 TABLET, FILM COATED ORAL at 20:59

## 2021-10-25 RX ADMIN — PIPERACILLIN SODIUM AND TAZOBACTAM SODIUM SCH MLS/HR: 3; .375 INJECTION, POWDER, LYOPHILIZED, FOR SOLUTION INTRAVENOUS at 16:00

## 2021-10-25 RX ADMIN — VANCOMYCIN HYDROCHLORIDE PRN EACH: 1 INJECTION, POWDER, LYOPHILIZED, FOR SOLUTION INTRAVENOUS at 16:24

## 2021-10-25 RX ADMIN — VANCOMYCIN HYDROCHLORIDE PRN EACH: 1 INJECTION, POWDER, LYOPHILIZED, FOR SOLUTION INTRAVENOUS at 16:27

## 2021-10-25 NOTE — RAD
EXAM: Chest, single view.



HISTORY: Fever.



COMPARISON: 4/13/2021



FINDINGS: A frontal view of the chest is obtained. There is no infiltrate, pleural effusion or pneumo
thorax. The heart is normal in size. There is a cardiac pacemaker with leads in expected position.



IMPRESSION: No acute pulmonary finding.



Electronically signed by: Charlotte Wheat MD (10/25/2021 12:02 PM) [id number]

## 2021-10-25 NOTE — PDOC1
History and Physical


Date of Admission


Date of Admission


DATE: 10/25/21 


TIME: 14:00





Identification/Chief Complaint


Chief Complaint


Fever, chills, abdominal pain





Source


Source:  Patient





History of Present Illness


History of Present Illness


Ms Tran is a 40yo F w/ PMHx Anxiety, Depression, Hypothyroidism, SVT s/p 

ablation s/p CHB and PPM insertion 8/2021, smoker who presents to ED with her 

 due to fever and chills for 5 days prior to presentation. She has 

associated sinus pressure and pain as well as slight cough and abdominal pain 

with nausea, vomited up food, only able to tolerate 1 bowl of chicken soup in 

the past 48 hours.


Patient had pacemaker placed in August, with unfortunate complications of 

requiring lead revision 3 days after discharge and dislodgment pacer 

postoperatively.  States area to left anterior chest started draining three days

prior to presentation, reports yellow drainage. She has an exposed suture that 

is bothering her, otherwise it is clean/dry. She c/o epigastric pain and right 

groin discomfort. Her purulent drainage was seen in the cardiology clinic today 

and culture was obtained in ED.


WBC 8.5 with 94% neutrophils, Hb 10.6, platelets 148, , K4.3, BUN 27, CR 

1.6, glucose 133, lactate 2.6, bilirubin 1.2, , , alkaline 

phosphatase 407, albumin 2.6, rapid group A strep negative, rapid COVID-19 

negative, rapid influenza negative, Monospot negative.


EKG ventricularly paced rhythm at 70 bpm no ST segment elevation


Chest radiograph with cardiac pacemaker leads in good positioning no acute 

findings.


Wound cultured, blood cultures obtained.


Admitted for further care





Past Medical History


Cardiovascular:  Other (SVT, CHB)


Psych:  Depression, Other


Endocrine:  Hypothyroidism





Past Surgical History


Past Surgical History:  Pacemaker (8/2021)





Family History


Family History:  Heart Disease, Hypothyroidism





Social History


Smoke:  1 pack per day


ALCOHOL:  none


Drugs:  None





Current Medications


Current Medications





Current Medications


Sodium Chloride 1,000 ml @  1,000 mls/hr 1X  ONCE IV  Last administered on 

10/25/21at 11:45;  Start 10/25/21 at 11:45;  Stop 10/25/21 at 12:44;  Status DC





Active Scripts


Active


Levothyroxine Sodium 75 Mcg Tablet 50 Mcg PO DAILYAC 30 Days


Metoprolol Succinate ( Xl ) (Metoprolol Succinate) 25 Mg Tab.er.24h 25 Mg PO 

DAILY 30 Days


Reported


Singulair Tablet ** (Montelukast Sodium) 10 Mg Tablet 10 Mg PO DAILY


Fluoxetine Hcl 20 Mg Capsule 1 Cap PO DAILY





Allergies


Allergies:  


Coded Allergies:  


     No Known Drug Allergies (Unverified , 10/25/21)





ROS


General:  YES: Chills, Fatigue, Malaise, Appetite; 


   No: Night Sweats, Other


PSYCHOLOGICAL ROS:  YES: Anxiety; 


   No: Behavioral Disorder, Concentration difficultie, Decreased libido, 

Depression, Disorientation, Hallucinations, Hostility, Irritablity, Memory 

difficulties, Mood Swings, Obsessive thoughts, Physical abuse, Sexual abuse, 

Sleep disturbances, Suicidal ideation, Other


Eyes:  No Blurry vision, No Decreased vision, No Double vision, No Dry eyes, No 

Excessive tearing, No Eye Pain, No Itchy Eyes, No Loss of vision, No 

Photophobia, No Scotomata, No Uses contacts, No Uses glasses, No Other


HEENT:  YES: Heacaches, Nasal congestion, Nasal discharge, Sinus pain, Sore 

Throat; 


   No: Visual Changes, Hearing change, Oral lesions, Epistaxis, Sneezing, 

Snoring, Tinnitus, Vertigo, Vocal changes, Other


ALLERGY AND IMMUNOLOGY:  No: Hives, Insect Bite Sensitivity, Itchy/Watery Eyes, 

Nasal Congestion, Post Nasal Drip, Seasonal Allergies, Other


Hematological and Lymphatic:  No: Bleeding Problems, Blood Clots, Blood 

Transfusions, Brusing, Night Sweats, Pallor, Swollen Lymph Nodes, Other


ENDOCRINE:  No: Breast Changes, Galactorrhea, Hair Pattern Changes, Hot Flashes,

Malaise/lethargy, Mood Swings, Palpitations, Polydipsia/polyuria, Skin Changes, 

Temperature Intolerance, Unexpected Weight Changes, Other


Breast:  No New/Changing Breast Lumps, No Nipple changes, No Nipple discharge, 

No Other


Respiratory:  YES: Cough; 


   No: Hemoptysis, Orthopnea, Pleuritic Pain, Shortness of breath, SOB with 

excertion, Sputum Changes, Stridor, Tachypnea, Wheezing, Other


Cardiovascular:  No Chest Pain, No Palpitations, No Orthopnea, No Paroxysmal 

Noc. Dyspnea, No Edema, No Lt Headedness, No Other


Gastrointestinal:  Yes Nausea, Yes Vomiting, Yes Abdominal Pain; 


   No Diarrhea, No Constipation, No Melena, No Hematochezia, No Other


Genitourinary:  No Dysuria, No Frequency, No Incontinence, No Hematuria, No 

Retention, No Discharge, No Urgency, No Pain, No Flank Pain, No Other, No , No ,

No , No , No , No , No 


Musculoskeletal:  No Gait Disturbance, No Joint Pain, No Joint Stiffness, No 

Joint Swelling, No Muscle Pain, No Muscular Weakness, No Pain In:, No Swelling 

In:, No Other


Neurological:  No Behavorial Changes, No Bowel/Bladder ControlChng, No 

Confusion, No Dizziness, No Gait Disturbance, No Headaches, No Impaired 

Coord/balance, No Memory Loss, No Numbness/Tingling, No Seizures, No Speech 

Problems, No Tremors, No Visual Changes, No Weakness, No Other


Skin:  No Dry Skin, No Eczema, No Hair Changes, No Lumps, No Mole Changes, No 

Mottling, No Nail Changes, No Pruritus, No Rash, No Skin Lesion Changes, No 

Other, No Acne





Physical Exam


General:  Alert, Oriented X3, Cooperative, mild distress


HEENT:  Atraumatic, PERRLA, EOMI, Mucous membr. moist/pink


Lungs:  Clear to auscultation, Normal air movement


Heart:  S1S2, RRR, no thrills, no rubs, no gallops, no murmurs, other (Left 

sided pacemaker site with some tenderness, no redness)


Abdomen:  Normal bowel sounds, Soft, No hepatosplenomegaly, No masses, Other 

(RUQ pain)


Rectal Exam:  not examined


Extremities:  No clubbing, No cyanosis, No edema, Normal pulses, No 

tenderness/swelling


Skin:  No rashes, No breakdown, No significant lesion


Neuro:  Normal gait, Normal speech, Strength at 5/5 X4 ext, Normal tone, 

Sensation intact, Cranial nerves 3-12 NL, Reflexes 2+


Psych/Mental Status:  Mental status NL, Mood NL





Vitals


Vitals





Vital Signs








  Date Time  Temp Pulse Resp B/P (MAP) Pulse Ox O2 Delivery O2 Flow Rate FiO2


 


10/25/21 11:20 98.5  17 105/69 (81) 99 Room Air  





 98.5       











Labs


Labs





Laboratory Tests








Test


 10/25/21


12:32 10/25/21


12:40


 


White Blood Count


 8.5 x10^3/uL


(4.0-11.0) 





 


Red Blood Count


 4.05 x10^6/uL


(3.50-5.40) 





 


Hemoglobin


 10.6 g/dL


(12.0-15.5) 





 


Hematocrit


 32.6 %


(36.0-47.0) 





 


Mean Corpuscular Volume 80 fL ()  


 


Mean Corpuscular Hemoglobin 26 pg (25-35)  


 


Mean Corpuscular Hemoglobin


Concent 33 g/dL


(31-37) 





 


Red Cell Distribution Width


 21.6 %


(11.5-14.5) 





 


Platelet Count


 148 x10^3/uL


(140-400) 





 


Neutrophils (%) (Auto) 94 % (31-73)  


 


Lymphocytes (%) (Auto) 2 % (24-48)  


 


Monocytes (%) (Auto) 2 % (0-9)  


 


Eosinophils (%) (Auto) 1 % (0-3)  


 


Basophils (%) (Auto) 0 % (0-3)  


 


Neutrophils # (Auto)


 8.0 x10^3/uL


(1.8-7.7) 





 


Lymphocytes # (Auto)


 0.2 x10^3/uL


(1.0-4.8) 





 


Monocytes # (Auto)


 0.2 x10^3/uL


(0.0-1.1) 





 


Eosinophils # (Auto)


 0.0 x10^3/uL


(0.0-0.7) 





 


Basophils # (Auto)


 0.0 x10^3/uL


(0.0-0.2) 





 


Sodium Level


 132 mmol/L


(136-145) 





 


Potassium Level


 4.3 mmol/L


(3.5-5.1) 





 


Chloride Level


 97 mmol/L


() 





 


Carbon Dioxide Level


 21 mmol/L


(21-32) 





 


Anion Gap 14 (6-14)  


 


Blood Urea Nitrogen


 27 mg/dL


(7-20) 





 


Creatinine


 1.6 mg/dL


(0.6-1.0) 





 


Estimated GFR


(Cockcroft-Gault) 35.2 


 





 


BUN/Creatinine Ratio 17 (6-20)  


 


Glucose Level


 133 mg/dL


(70-99) 





 


Lactic Acid Level


 2.6 mmol/L


(0.4-2.0) 





 


Calcium Level


 8.0 mg/dL


(8.5-10.1) 





 


Total Bilirubin


 1.2 mg/dL


(0.2-1.0) 





 


Aspartate Amino Transf


(AST/SGOT) 190 U/L


(15-37) 





 


Alanine Aminotransferase


(ALT/SGPT) 166 U/L


(14-59) 





 


Alkaline Phosphatase


 407 U/L


() 





 


Total Protein


 6.1 g/dL


(6.4-8.2) 





 


Albumin


 2.6 g/dL


(3.4-5.0) 





 


Albumin/Globulin Ratio 0.7 (1.0-1.7)  


 


Heterophil Agglutinins


 Negative


(NEGATIVE) 





 


Influenza Type A Antigen


 Negative


(NEGATIVE) 





 


Influenza Type B Antigen


 Negative


(NEGATIVE) 





 


SARS-CoV-2 Antigen (Rapid)


 Negative


(NEGATIVE) 





 


Group A Streptococcus Rapid


 


 Negative


(NEGATIVE)








Laboratory Tests








Test


 10/25/21


12:32 10/25/21


12:40


 


White Blood Count


 8.5 x10^3/uL


(4.0-11.0) 





 


Red Blood Count


 4.05 x10^6/uL


(3.50-5.40) 





 


Hemoglobin


 10.6 g/dL


(12.0-15.5) 





 


Hematocrit


 32.6 %


(36.0-47.0) 





 


Mean Corpuscular Volume 80 fL ()  


 


Mean Corpuscular Hemoglobin 26 pg (25-35)  


 


Mean Corpuscular Hemoglobin


Concent 33 g/dL


(31-37) 





 


Red Cell Distribution Width


 21.6 %


(11.5-14.5) 





 


Platelet Count


 148 x10^3/uL


(140-400) 





 


Neutrophils (%) (Auto) 94 % (31-73)  


 


Lymphocytes (%) (Auto) 2 % (24-48)  


 


Monocytes (%) (Auto) 2 % (0-9)  


 


Eosinophils (%) (Auto) 1 % (0-3)  


 


Basophils (%) (Auto) 0 % (0-3)  


 


Neutrophils # (Auto)


 8.0 x10^3/uL


(1.8-7.7) 





 


Lymphocytes # (Auto)


 0.2 x10^3/uL


(1.0-4.8) 





 


Monocytes # (Auto)


 0.2 x10^3/uL


(0.0-1.1) 





 


Eosinophils # (Auto)


 0.0 x10^3/uL


(0.0-0.7) 





 


Basophils # (Auto)


 0.0 x10^3/uL


(0.0-0.2) 





 


Sodium Level


 132 mmol/L


(136-145) 





 


Potassium Level


 4.3 mmol/L


(3.5-5.1) 





 


Chloride Level


 97 mmol/L


() 





 


Carbon Dioxide Level


 21 mmol/L


(21-32) 





 


Anion Gap 14 (6-14)  


 


Blood Urea Nitrogen


 27 mg/dL


(7-20) 





 


Creatinine


 1.6 mg/dL


(0.6-1.0) 





 


Estimated GFR


(Cockcroft-Gault) 35.2 


 





 


BUN/Creatinine Ratio 17 (6-20)  


 


Glucose Level


 133 mg/dL


(70-99) 





 


Lactic Acid Level


 2.6 mmol/L


(0.4-2.0) 





 


Calcium Level


 8.0 mg/dL


(8.5-10.1) 





 


Total Bilirubin


 1.2 mg/dL


(0.2-1.0) 





 


Aspartate Amino Transf


(AST/SGOT) 190 U/L


(15-37) 





 


Alanine Aminotransferase


(ALT/SGPT) 166 U/L


(14-59) 





 


Alkaline Phosphatase


 407 U/L


() 





 


Total Protein


 6.1 g/dL


(6.4-8.2) 





 


Albumin


 2.6 g/dL


(3.4-5.0) 





 


Albumin/Globulin Ratio 0.7 (1.0-1.7)  


 


Heterophil Agglutinins


 Negative


(NEGATIVE) 





 


Influenza Type A Antigen


 Negative


(NEGATIVE) 





 


Influenza Type B Antigen


 Negative


(NEGATIVE) 





 


SARS-CoV-2 Antigen (Rapid)


 Negative


(NEGATIVE) 





 


Group A Streptococcus Rapid


 


 Negative


(NEGATIVE)











Images


Images


Chest radiograph:


A frontal view of the chest is obtained. There is no infiltrate, pleural 

effusion or pneumothorax. The heart is normal in size. There is a cardiac 

pacemaker with leads in expected position.





IMPRESSION: No acute pulmonary finding.





VTE Prophylaxis Ordered


VTE Prophylaxis Devices:  No


VTE Pharmacological Prophylaxi:  No





Assessment/Plan


Assessment/Plan


A/P:


Fever and chills - concern for viral infection vs pacer site infection vs 

sinusitis, bacterial vs COVID 19 breakthrough infection. Will f/u blood and 

wound cultures. Empiric zosyn and vancomycin. Ultimately if this is indeed a 

pacer site infection she would need explant


Abdominal pain - with transaminitis, ?Mononucleosis vs ?COVID vs sepsis of 

bacterial origin, f/u cultures, UA. Will check abdominal US. 


Complete Heart Block - s/p pacer insertion, post op complications as above. If 

the pacer is source of infection I have d/w cardiology would need explant while 

on antibiotics and continued external or transvenous pacing and likely eventual 

tertiary care center transfer for that.


PSVT - s/p AV ablation and CHB, now s/p PPM with post-op complications. 

Currently pacing.


Hyponatremia - likely due to poor PO intake, will give NSS


SON - likely vasomotor nephropathy from above. Will check UA, urine lytes


Hypothyroidism; on replacement. TSH supratherapeutic level adjustment per PCP 

previously


Transaminitis - ?Mononucleosis vs ?COVID vs sepsis of bacterial origin


ADHD - on Adderall outpatient


Tobacco use disorder - counseled on cessation


Anemia - will check iron studies


Lactic acidosis - likely nutritional and due to infection, will trend, hydrate





FEN - General diet


PPX - ambulatory


FULL CODE


Dispo - inpatient for above





Justifications for Admission


Other Justification














JUDITH OVALLE MD        Oct 25, 2021 14:04

## 2021-10-25 NOTE — PHYS DOC
Past Medical History


Past Medical History:  Anxiety, Bipolar, Depression, Hypothyroid, Other


Additional Past Medical Histor:  SVT, syncope


Past Surgical History:  Pacemaker


Smoking Status:  Current Every Day Smoker


Alcohol Use:  None





General Adult


EDM:


Chief Complaint:  POST-OP PROBLEM





HPI:


HPI:





Patient is a 41  year old female was sent here by her cardiologist today for 

reported fevers, which began over the weekend.  She has been afebrile today.  

She reports that she is noticed some small amount of yellow drainage from her 

left upper chest wall, where she had a fairly recent pacemaker pocket revision 

done at .  She also sees cardiology here.  She does report a mild dry cough, 

sore throat, headache and by body aches.  She denies urinary symptoms.  She 

denies vomiting or diarrhea.  She denies abdominal pain.  She denies chest pain 

or shortness of breath.  She denies severe headache or neck stiffness.  She 

denies rash.  She reports that she is eating and drinking well, though a few 

days ago she did not have as much of an appetite.  She is fully vaccinated 

against Covid.  Cardiology wanted her admitted, blood cultures, wound cultures 

ordered.  No specific recommendation for antibiotics were made.





Review of Systems:


Review of Systems:


Constitutional:   Reports fever and chills.


Eyes:   Denies change in visual acuity. []


HENT:   She does report some nasal congestion and mild sore throat.


Respiratory:   Reports mild, dry cough.  No dyspnea reported.


Cardiovascular:   Denies chest pain or edema. [] 


GI:   Denies abdominal pain, nausea, vomiting, bloody stools or diarrhea. [] 


:  Denies urinary symptoms.


Musculoskeletal:   Reports bilateral flank comfort and diffuse myalgias.  No 

joint swelling or redness.


Integument:   Denies rash. [] 


Neurologic:   Denies headache, focal weakness or sensory changes. [] 


Endocrine:   Denies polyuria or polydipsia. [] 


Lymphatic:  Denies swollen glands. [] 


Psychiatric:  Denies depression or anxiety. []





Heart Score:


C/O Chest Pain:  No


Risk Factors:


Risk Factors:  DM, Current or recent (<one month) smoker, HTN, HLP, family 

history of CAD, obesity.


Risk Scores:


Score 0 - 3:  2.5% MACE over next 6 weeks - Discharge Home


Score 4 - 6:  20.3% MACE over next 6 weeks - Admit for Clinical Observation


Score 7 - 10:  72.7% MACE over next 6 weeks - Early Invasive Strategies





Allergies:


Allergies:





Allergies








Coded Allergies Type Severity Reaction Last Updated Verified


 


  No Known Drug Allergies    10/25/21 No











Physical Exam:


PE:





Constitutional: Well developed, well nourished, no acute distress, non-toxic 

appearance. []


HENT: Normocephalic, atraumatic, bilateral external ears normal, oropharynx 

moist, no oral exudates, nose normal. []


Eyes: PERRL, EOMI, conjunctiva normal, no discharge. [] 


Neck: Normal range of motion, no tenderness, supple, no meningismus.  Trachea is

 midline.


Cardiovascular: Tachycardic, low 100s, regular, +2 radial and posterior tibial 

pulses bilaterally.  Well-perfused appearing.


Lungs & Thorax:  Bilateral breath sounds clear to auscultation, no rales, rhon

chi or wheezes.,  No evidence of distress.


Abdomen: Bowel sounds normal, soft, no tenderness, no masses, no pulsatile 

masses.  No CVA tenderness.


Skin: Warm, dry, no erythema, no rash.  The skin on her left upper chest wall is

 clean, dry, intact.  There are 2 small Vicryl sutures minimally protruding from

 the skin.  There is very scant clear/yellow drainage from the more lateral 

incision site.  There is no warmth or erythema, there is no fluctuance, there is

 no palpable tenderness.  There is no induration.


Back: No tenderness, no CVA tenderness. [] 


Extremities: No tenderness, no cyanosis, no clubbing, ROM intact, no edema. [] 


Neurologic: Alert and oriented X 3, normal motor function, normal sensory 

function, no focal deficits noted. []


Psychologic: Affect normal, judgement normal, mood normal. []





Current Patient Data:


Vital Signs:





                                   Vital Signs








  Date Time  Temp Pulse Resp B/P (MAP) Pulse Ox O2 Delivery O2 Flow Rate FiO2


 


10/25/21 11:20 98.5  17 105/69 (81) 99 Room Air  





 98.5       











EKG:


EKG:


EKG is interpreted at 1151


Rhythm is sinus tachycardia


Rate is 107 bpm


Axis is left


No acute





Radiology/Procedures:


Radiology/Procedures:


                                 IMAGING REPORT





                                     Signed





PATIENT: REI STANFORD   ACCOUNT: [id number]     MRN#: [id number]


: 1980           LOCATION: ER              AGE: 41


SEX: F                    EXAM DT: 10/25/21         ACCESSION#: [id number]


STATUS: PRE ER            ORD. PHYSICIAN: MANAV MICHELLE DO


REASON: fever


PROCEDURE: PORTABLE CHEST 1V





EXAM: Chest, single view.





HISTORY: Fever.





COMPARISON: 2021





FINDINGS: A frontal view of the chest is obtained. There is no infiltrate, pleur

al effusion or pneumothorax. The heart is normal in size. There is a cardiac 

pacemaker with leads in expected position.





IMPRESSION: No acute pulmonary finding.





Electronically signed by: Charlotte Hernandes MD (10/25/2021 12:02 PM) [id number]














DICTATED and SIGNED BY:     CHARLOTTE HERNANDES MD


DATE:     10/25/21 [id number]





Course & Med Decision Making:


Course & Med Decision Making


Pertinent Labs and Imaging studies reviewed. (See chart for details)





Blood cultures were ordered.  I obtained a swab of the small amount of fluid 

from her chest wall site, and wound cultures ordered.  UA and urine cultures are

 ordered.  The patient is afebrile here.  She is given a liter of IV fluids.  

Tachycardia is resolved.  She remains hemodynamically stable.  She is extremely 

well-appearing, she has no complaints, denies any nausea or pain currently.  

Covid antigen testing is negative, with formal PCR pending.  She is comfortable 

with the plan for admission.  She is accepted for admission by Dr. Mallory.





Chantell Disclaimer:


Dragon Disclaimer:


This electronic medical record was generated, in whole or in part, using a voice

 recognition dictation system.





Departure


Departure


Impression:  


   Primary Impression:  


   History of fever


   Additional Impression:  


   Presence of cardiac pacemaker


Disposition:   ADMITTED AS INPATIENT


Admitting Physician:  HIMS


Condition:  STABLE


Referrals:  


CINDY STANFORD MD (PCP)











MANAV MICHELLE DO             Oct 25, 2021 11:38

## 2021-10-25 NOTE — RAD
EXAM: Abdomen sonogram.



HISTORY: Pain.



TECHNIQUE: Sonographic imaging of the abdomen was performed.



COMPARISON: None.



FINDINGS: There is hepatomegaly. No focal hepatic lesion is seen. The gallbladder is unremarkable. Th
e common bile duct is normal in caliber. The pancreas, spleen and kidneys are unremarkable. The dista
l abdominal aorta is obscured due to bowel gas. The inferior vena cava is patent..



IMPRESSION:

1. Hepatomegaly.

2. No acute sonographic finding.



Electronically signed by: Charlotte Wheat MD (10/25/2021 3:32 PM) [id number]

## 2021-10-25 NOTE — EKG
[facility]

              [address] [place] [address]

Test Date:    2021-10-25               Test Time:    11:48:41

Pat Name:     REI STANFORD           Department:   

Patient ID:   [id number]           Room:          

Gender:       F                        Technician:   

:          1980               Requested By: MANAV MICHELLE

Order Number: [id number]           Reading MD:   Magdaleno Barreto

                                 Measurements

Intervals                              Axis          

Rate:         107                      P:            58

RI:           170                      QRS:          -89

QRSD:         168                      T:            80

QT:           378                                    

QTc:          511                                    

                           Interpretive Statements

ATRIAL SENSED VENTRICULAR PACED RHYTHM

Electronically Signed On 10- 14:34:40 CDT by Magdaleno Barreto

## 2021-10-25 NOTE — NUR
Pharmacy Vancomycin Dosing Note



S:Consulted to monitor and dose vancomycin started 10/25/21.



O:REI STANFORD is a 41 year old F with empiric coverage for fever, possible PM site 
infection.



Height: 5 feet, 6 inches

Weight: 85.0 kg

Dosing Weight: Actual



Other Antibiotics: 

ZOSYN 3.375G IV Q6HRS



LABS:

Last BUN: 27 

Last Creatinine: 1.6 

Creatinine Clearance: 49 mL/min

Last WBC: 8.5 

Tmax (past 24 hours): 98.5 



Microbiology: 

BLOOD CX PENDING

THROAT CX PENDING





A: Patient requires vancomycin as empiric therapy for fevers in setting of possible PPM site 
infection. Goal trough 15-20 mcg/ml for empiric use. Patient's SCr is 1.6 with an eCrCl of 
49 ml/min. Due to elevated SCr will dose cautiously:



P: 1. Initiate Vancomycin 1500 mg IV x 1 dose, then 1250 mg IV q24h

   2. Follow up Trough level on 10/27/21 at 1530 

   3. Pharmacy will continue to monitor, follow and adjust therapy as needed.

 

 MANAV DOMINGUEZ [facility], 10/25/21 [id number]

## 2021-10-26 VITALS — DIASTOLIC BLOOD PRESSURE: 62 MMHG | SYSTOLIC BLOOD PRESSURE: 119 MMHG

## 2021-10-26 VITALS — SYSTOLIC BLOOD PRESSURE: 95 MMHG | DIASTOLIC BLOOD PRESSURE: 54 MMHG

## 2021-10-26 VITALS — SYSTOLIC BLOOD PRESSURE: 111 MMHG | DIASTOLIC BLOOD PRESSURE: 52 MMHG

## 2021-10-26 VITALS — SYSTOLIC BLOOD PRESSURE: 105 MMHG | DIASTOLIC BLOOD PRESSURE: 59 MMHG

## 2021-10-26 LAB
ALBUMIN SERPL-MCNC: 2.1 G/DL (ref 3.4–5)
ALBUMIN/GLOB SERPL: 0.5 {RATIO} (ref 1–1.7)
ALP SERPL-CCNC: 319 U/L (ref 46–116)
ALT SERPL-CCNC: 249 U/L (ref 14–59)
AMORPH SED URNS QL MICRO: PRESENT /HPF
ANION GAP SERPL CALC-SCNC: 15 MMOL/L (ref 6–14)
APTT PPP: YELLOW S
AST SERPL-CCNC: 302 U/L (ref 15–37)
BACTERIA #/AREA URNS HPF: (no result) /HPF
BILIRUB SERPL-MCNC: 1.5 MG/DL (ref 0.2–1)
BILIRUB UR QL STRIP: NEGATIVE
BUN SERPL-MCNC: 29 MG/DL (ref 7–20)
BUN/CREAT SERPL: 19 (ref 6–20)
CALCIUM SERPL-MCNC: 7.6 MG/DL (ref 8.5–10.1)
CHLORIDE SERPL-SCNC: 99 MMOL/L (ref 98–107)
CO2 SERPL-SCNC: 18 MMOL/L (ref 21–32)
CREAT SERPL-MCNC: 1.5 MG/DL (ref 0.6–1)
FIBRINOGEN PPP-MCNC: (no result) MG/DL
GFR SERPLBLD BASED ON 1.73 SQ M-ARVRAT: 37.9 ML/MIN
GLUCOSE SERPL-MCNC: 71 MG/DL (ref 70–99)
NITRITE UR QL STRIP: NEGATIVE
PH UR STRIP: 5.5 [PH]
POTASSIUM SERPL-SCNC: 4.2 MMOL/L (ref 3.5–5.1)
PROT SERPL-MCNC: 6.1 G/DL (ref 6.4–8.2)
PROT UR STRIP-MCNC: 30 MG/DL
RBC #/AREA URNS HPF: 0 /HPF (ref 0–2)
SODIUM SERPL-SCNC: 132 MMOL/L (ref 136–145)
UROBILINOGEN UR-MCNC: 1 MG/DL
WBC #/AREA URNS HPF: (no result) /HPF (ref 0–4)

## 2021-10-26 RX ADMIN — VANCOMYCIN HYDROCHLORIDE PRN EACH: 1 INJECTION, POWDER, LYOPHILIZED, FOR SOLUTION INTRAVENOUS at 07:34

## 2021-10-26 RX ADMIN — PIPERACILLIN SODIUM AND TAZOBACTAM SODIUM SCH MLS/HR: 3; .375 INJECTION, POWDER, LYOPHILIZED, FOR SOLUTION INTRAVENOUS at 05:42

## 2021-10-26 RX ADMIN — PIPERACILLIN SODIUM AND TAZOBACTAM SODIUM SCH MLS/HR: 3; .375 INJECTION, POWDER, LYOPHILIZED, FOR SOLUTION INTRAVENOUS at 17:09

## 2021-10-26 RX ADMIN — ACETAMINOPHEN PRN MG: 325 TABLET, FILM COATED ORAL at 14:01

## 2021-10-26 RX ADMIN — ACETAMINOPHEN PRN MG: 325 TABLET, FILM COATED ORAL at 05:41

## 2021-10-26 RX ADMIN — BACITRACIN SCH MLS/HR: 5000 INJECTION, POWDER, FOR SOLUTION INTRAMUSCULAR at 05:40

## 2021-10-26 RX ADMIN — BACITRACIN SCH MLS/HR: 5000 INJECTION, POWDER, FOR SOLUTION INTRAMUSCULAR at 07:00

## 2021-10-26 RX ADMIN — PIPERACILLIN SODIUM AND TAZOBACTAM SODIUM SCH MLS/HR: 3; .375 INJECTION, POWDER, LYOPHILIZED, FOR SOLUTION INTRAVENOUS at 11:14

## 2021-10-26 NOTE — NUR
REC'D CALL FROM [facility] TRANFER TEAM. PT HAS ROOM ASSIGNED FOR TRANSFER. SHE WILL GO TO [facility]. 
ONCE K[facility] IS CONTACTED, WILL RETURN CALL TO [facility] FOR NOTIFICATION.

## 2021-10-26 NOTE — NUR
ATTEMPT TO CONTACT RECEIVING RNELIZABETH AT [facility]. [facility] WHEN AVAILABLE. [facility] ARRIVES FOR 
PT TRANSPORT. BRIEF REPORT GIVEN TO THEM, THEN PT AMBULATES TO [facility] ET IS SECURED FOR 
TRANSPORT WITH CARDIAC MONITORING. PIV LEFT IN PLACE, BUT NO FLUIDS ORDERED AT THIS TIME.

## 2021-10-26 NOTE — NUR
[facility] TO BE HERE WITHIN THE HOUR FOR TRANSFER TO [facility], [facility] [address] AT [facility] 
NOTIFIED OF PROVIDER FOR TRANSPORT AND EXPECTED DEPARTURE TIME.

## 2021-10-26 NOTE — CONS
DATE OF CONSULTATION: 10/26/2021

REFERRING PHYSICIAN:  Afshin Villanueva MD



REASON FOR CONSULTATION:  Pacemaker site infection.



HISTORY OF PRESENT ILLNESS:  A 41-year-old female who was admitted with 

complaints of fevers, chills, nonproductive cough, abdominal pain, nausea, 

vomiting, which started last Friday.  Her appetite was poor.  She had 

generalized weakness.  The patient underwent PPM placement on 08/06/2021 with 

history of typical AVNRT, status post ablation, complicated by complete AVB, 

status post dual chamber PPM on 08/06/2021 at [facility].  She then 

presented again to ED at  on 08/12/2021 after experiencing syncopal episodes 

at home associated with tachycardia.  She underwent RV lead revision on 

08/12/2021, was admitted to ICU.  She was found to have a lead displacement 

again and underwent a second replacement during that hospitalization.  The 

patient does not recall taking any antibiotics during that hospitalization and 

subsequent to that, she was doing fine until last Friday when she spontaneously 

noticed that one of her puppy was sniffing the area and then she looked at the 

area and found purulent drainage, which was yellowish in color.  The patient 

denies any history of injury.  She feels that there are sutures, which are 

trying to come out from the same area.  Upon presentation, she had purulent 

drainage.  White count was normal with bandemia.  She had SON with creatinine of

1.6.  LFTs were elevated with a mild hyperbilirubinemia.  Albumin was 2.6.  

Sodium of 132.  UA was negative.  SARS-COVID was negative.  Influenza screen was

negative.  Group A strep and heterophile were negative.  She underwent blood 

cultures prelim are GPC in clusters from 4/4 bottles.  The patient is on IV 

vancomycin and Zosyn.  ID consultation has been requested for antibiotic 

management.  Today, the patient states she continues to feel weak.  Denies any 

further episodes of fevers, chills.  Her appetite remains poor.  She has 

generalized weakness.  Continues to have pain mainly over the back, sacroiliac 

area.  Denies any history of injury.  Does have some itching over the right 

lower extremity just below the knee, but no rash.  With movement, her left hip 

causes pain.  She is unable to pick her foot up as she has some pain in the 

sacroiliac area.



PAST MEDICAL HISTORY:  AVNRT, CHB, asthma, ADHD, hypothyroidism, status post 

pacemaker with a revision x 2.



PAST SURGICAL HISTORY:  Pacemaker placement.



FAMILY HISTORY:  Heart disease.



SOCIAL HISTORY:  Smokes 1 pack per day.  No alcohol, no drugs.  Lives with 

.  Has dogs. Works as an LPN at [employer].



CURRENT MEDICATIONS:  IV vancomycin and Zosyn.  Other medications reviewed in 

medication list.



ALLERGIES:  No known drug allergies.



REVIEW OF SYSTEMS:  Negative except for above in HPI.



PHYSICAL EXAMINATION:

VITAL SIGNS:  Temperature 98.3, pulse 99, respiratory rate 18, blood pressure 

119/62, oxygen saturation 94% on room air.

GENERAL:  Alert, oriented x 3, pleasant female, nontoxic appearing, in no acute 

distress, lying comfortably in bed.  No family at bedside.

HEENT:  Normocephalic, atraumatic.  Anicteric.  No thrush.  Oral mucosa moist.

NECK:  Supple, no JVD, no thyromegaly.

LUNGS:  Clear bilaterally.  No wheezing.

HEART:  S1, S2.  RRR.  No gallops, no murmurs.  Chest wall, left sided pacemaker

site has minimal erythema, some skin breakdown, suture visible.  I could not 

express any purulent drainage today.  No warmth.  No underlying fluctuation.

ABDOMEN:  Soft, nontender, nondistended, no rebound or guarding.

EXTREMITIES:  No edema, no cyanosis.

DERMATOLOGIC:  Warm, dry, no generalized rash.  Multiple tattoos.

NEUROLOGIC:  Alert and oriented x 3, grossly nonfocal.

PSYCHIATRIC:  Calm and cooperative.

MUSCULOSKELETAL:  No joint swelling or decrease in range of motion noted.  There

is mild tenderness present over the sacroiliac area, especially the left.  Gait 

not observed.



LABORATORY DATA:  WBC 8.5, hemoglobin 10.6, hematocrit 32.6, platelets 145, 

bands 41.  Sodium 132, potassium 4.2, chloride 99, bicarbonate 18, BUN 29, 

creatinine 1.5, calcium 7.2, lactate 2.6, was now 1.2, total bilirubin 1.5, AST 

302, , alkaline phosphatase 319.  Total protein 6.1, albumin 2.1.  UA 

negative.  Sarcoid negative.  Influenza screen negative.  Heterophile 

agglutinins negative.  Group B strep negative.



MICRO:  Nose culture, mixed upper respiratory annika.  Blood culture 4/4 bottles 

GPC.  Swab culture not done.



IMAGING:  Chest x-ray shows no acute pulmonary findings.  Ultrasound shows 

hepatomegaly, no acute sonographic findings.



IMPRESSION:

1.  Sepsis from Gram-positive bacteremia, source appears permanent pacemaker 

Pocket infection.

2.  Gram-positive bacteremia, 4/4 bottles present on admission, source appears 

below.

3.  Permanent pacemaker with multiple lead revision in 08/2021 at [facility].

4. 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 
bacteremia 

5.  History of congenital heart block.

6.  History of paroxysmal supraventricular tachycardia with previous ablation.

7.  Acute kidney injury.

8.  Transaminitis, hyperbilirubinemia with hepatomegaly on Ultrasound.

9.  Anemia.

10.  Hyponatremia.

11.  Hypothyroidism.

12.  COVID, influenza screen negative.



RECOMMENDATIONS:

1.  Discontinue IV vancomycin due to SON.

2.  Start daptomycin and continue Zosyn.

3.  Follow up GPC in blood cultures.

4.  Repeat blood cultures.

5.  Plans are for  transfer to [facility] for further evaluation and treatment per 
cardiology team per D/W Dr Villanueva.

6.  PPM may need explantation.

7.  Antibiotics will be modified depending on further culture data.

8.  Follow up labs and cultures.

9.  We will obtain CT abdomen and pelvis due to lt sacroiliac pain.



Thank you, Dr. Villanueva for consulting Infectious Disease to participate in 

this patient's care.  Discussed with Dr. Villanueva.  If you have any questions, 

do not hesitate to contact me.  We will follow along with you if patient is 

still at [facility].







EVELYN/BREANA CENTENO: Colin   DD: 10/26/2021 10:35

DT: 10/26/2021 11:14   TID: [id number]

Northwell HealthAMELIA

## 2021-10-26 NOTE — PDOC2
ANAYELI PANDA APRN 10/26/21 0954:


CARDIAC CONSULT


DATE OF CONSULT


Date of Consult


DATE: 10/26/21 


TIME: 09:23





REASON FOR CONSULT


Reason for Consult:


Fever, PPM





REFERRING PHYSICIAN


Referring Physician:


Antonieta





SOURCE


Source:  Chart review, Patient





HISTORY OF PRESENT ILLNESS


HISTORY OF PRESENT ILLNESS


This is a 40 yo female admitted for complains of fever and chills. He has been 

having nonproductive cough, abdominal pain and nausea and vomiting. He has not 

been able to eat well in the last 2 days. He had a PPM 8/6/2021 and eventual 

lead revision in 8/14/2021. She was seen in cardiology clinic yesterday and was 

noted with purulent drain on her pacemaker site and culture has been sent.





PAST MEDICAL HISTORY


Cardiovascular:  Other (CHB, AVNRT)


Pulmonary:  Asthma


Psych:  Other (ADHD)


Endocrine:  Hypothyroidism





PAST SURGICAL HISTORY


Past Surgical History:  Pacemaker





FAMILY HISTORY


Family History:  Heart Disease





SOCIAL HISTORY


Smoke:  1 pack per day


ALCOHOL:  none


Drugs:  None


Lives:  with Family





CURRENT MEDICATIONS


CURRENT MEDICATIONS





Current Medications








 Medications


  (Trade)  Dose


 Ordered  Sig/Kwame


 Route


 PRN Reason  Start Time


 Stop Time Status Last Admin


Dose Admin


 


 Sodium Chloride  1,000 ml @ 


 1,000 mls/hr  1X  ONCE


 IV


   10/25/21 11:45


 10/25/21 12:44 DC 10/25/21 11:45





 


 Tramadol HCl


  (Ultram)  50 mg  PRN Q6HRS  PRN


 PO


 MODERATE-SEVERE PAIN  10/25/21 14:45


    10/26/21 05:41





 


 Acetaminophen


  (Tylenol)  650 mg  PRN Q6HRS  PRN


 PO


 MILD PAIN / TEMP > 100.3'F  10/25/21 14:45


    10/26/21 05:41





 


 Piperacillin Sod/


 Tazobactam Sod


 3.375 gm/Sodium


 Chloride  50 ml @ 


 100 mls/hr  Q6HRS


 IV


   10/25/21 16:00


    10/26/21 05:42





 


 Vancomycin HCl


 1.5 gm/Sodium


 Chloride  500 ml @ 


 250 mls/hr  1X  ONCE


 IV


   10/25/21 16:00


 10/25/21 17:59 DC 10/25/21 16:00





 


 Vancomycin HCl


  (Vanco Per


 Pharmacy)  1 each  PRN DAILY  PRN


 [facility]


 SEE COMMENTS  10/25/21 15:00


    10/26/21 07:34





 


 Sodium Chloride  1,000 ml @ 


 125 mls/hr  Q8H


 IV


   10/25/21 15:00


 10/26/21 14:59  10/26/21 05:40





 


 Fluoxetine HCl


  (PROzac)  20 mg  DAILY


 PO


   10/26/21 09:00


    10/26/21 08:05





 


 Montelukast Sodium


  (Singulair)  10 mg  HS


 PO


   10/25/21 23:00


    10/25/21 23:41














ALLERGIES


ALLERGIES:  


Coded Allergies:  


     No Known Drug Allergies (Unverified , 10/25/21)





ROS


Review of System


14 point ROS evaluated with pertinent positives noted per HPI





PHYSICAL EXAM


General:  Alert, Oriented X3, Cooperative, No acute distress


HEENT:  Atraumatic, Mucous membr. moist/pink


Lungs:  Clear to auscultation, Normal air movement


Heart:  Regular rate (paced)


Abdomen:  Soft, No tenderness


Extremities:  No cyanosis, No edema


Skin:  Other (left surgical chest incision with some erythema)


Neuro:  Normal speech, Sensation intact


Psych/Mental Status:  Mental status NL, Mood NL


MUSCULOSKELETAL:  Full range of motion without pain





VITALS/I&O


VITALS/I&O:





                                   Vital Signs








  Date Time  Temp Pulse Resp B/P (MAP) Pulse Ox O2 Delivery O2 Flow Rate FiO2


 


10/26/21 08:05   18  94 Room Air  


 


10/26/21 07:00 98.3 99  119/62 (81)    





 98.3       














                                    I & O   


 


 10/25/21 10/25/21 10/26/21





 15:00 23:00 07:00


 


Intake Total  0 ml 1200 ml


 


Output Total  150 ml 


 


Balance  -150 ml 1200 ml











LABS


Lab:





                                Laboratory Tests








Test


 10/25/21


12:32 10/25/21


12:40 10/25/21


16:47 10/26/21


04:30


 


White Blood Count


 8.5 x10^3/uL


(4.0-11.0) 


 


 





 


Red Blood Count


 4.05 x10^6/uL


(3.50-5.40) 


 


 





 


Hemoglobin


 10.6 g/dL


(12.0-15.5)  L 


 


 





 


Hematocrit


 32.6 %


(36.0-47.0)  L 


 


 





 


Mean Corpuscular Volume


 80 fL ()


 


 


 





 


Mean Corpuscular Hemoglobin 26 pg (25-35)     


 


Mean Corpuscular Hemoglobin


Concent 33 g/dL


(31-37) 


 


 





 


Red Cell Distribution Width


 21.6 %


(11.5-14.5)  H 


 


 





 


Platelet Count


 148 x10^3/uL


(140-400) 


 


 





 


Neutrophils (%) (Auto) 94 % (31-73)  H   


 


Lymphocytes (%) (Auto) 2 % (24-48)  L   


 


Monocytes (%) (Auto) 2 % (0-9)     


 


Eosinophils (%) (Auto) 1 % (0-3)     


 


Basophils (%) (Auto) 0 % (0-3)     


 


Neutrophils # (Auto)


 8.0 x10^3/uL


(1.8-7.7)  H 


 


 





 


Lymphocytes # (Auto)


 0.2 x10^3/uL


(1.0-4.8)  L 


 


 





 


Monocytes # (Auto)


 0.2 x10^3/uL


(0.0-1.1) 


 


 





 


Eosinophils # (Auto)


 0.0 x10^3/uL


(0.0-0.7) 


 


 





 


Basophils # (Auto)


 0.0 x10^3/uL


(0.0-0.2) 


 


 





 


Segmented Neutrophils % 54 % (35-66)     


 


Band Neutrophils % 41 % (0-9)  H   


 


Lymphocytes % 2 % (24-48)  L   


 


Monocytes % 1 % (0-10)     


 


Eosinophils % 2 % (0-5)     


 


Platelet Estimate


 Adequate


(ADEQUATE) 


 


 





 


Large Platelets Present     


 


Hypochromasia Mod     


 


Poikilocytosis Mod     


 


Anisocytosis Mod     


 


Microcytosis Mod     


 


Target Cells Occ     


 


Tear Drop Cells Occ     


 


Ovalocytes Few     


 


Gabriel Cells Few     


 


Crenated Cell      


 


Schistocytes Occ     


 


RBC Morphology Bizarre Forms Present     


 


Sodium Level


 132 mmol/L


(136-145)  L 


 


 132 mmol/L


(136-145)  L


 


Potassium Level


 4.3 mmol/L


(3.5-5.1) 


 


 4.2 mmol/L


(3.5-5.1)


 


Chloride Level


 97 mmol/L


()  L 


 


 99 mmol/L


()


 


Carbon Dioxide Level


 21 mmol/L


(21-32) 


 


 18 mmol/L


(21-32)  L


 


Anion Gap 14 (6-14)     15 (6-14)  H


 


Blood Urea Nitrogen


 27 mg/dL


(7-20)  H 


 


 29 mg/dL


(7-20)  H


 


Creatinine


 1.6 mg/dL


(0.6-1.0)  H 


 


 1.5 mg/dL


(0.6-1.0)  H


 


Estimated GFR


(Cockcroft-Gault) 35.2  


 


 


 37.9  





 


BUN/Creatinine Ratio 17 (6-20)     19 (6-20)  


 


Glucose Level


 133 mg/dL


(70-99)  H 


 


 71 mg/dL


(70-99)


 


Lactic Acid Level


 2.6 mmol/L


(0.4-2.0)  H 


 1.2 mmol/L


(0.4-2.0) 





 


Calcium Level


 8.0 mg/dL


(8.5-10.1)  L 


 


 7.6 mg/dL


(8.5-10.1)  L


 


Iron Level


 5 ug/dL


()  L 


 


 





 


Total Iron Binding Capacity


 303 ug/dL


(250-450) 


 


 





 


Iron Saturation 2 % (15-34)  L   


 


Total Bilirubin


 1.2 mg/dL


(0.2-1.0)  H 


 


 1.5 mg/dL


(0.2-1.0)  H


 


Aspartate Amino Transferase


(AST) 190 U/L


(15-37)  H 


 


 302 U/L


(15-37)  H


 


Alanine Aminotransferase (ALT)


 166 U/L


(14-59)  H 


 


 249 U/L


(14-59)  H


 


Alkaline Phosphatase


 407 U/L


()  H 


 


 319 U/L


()  H


 


Total Protein


 6.1 g/dL


(6.4-8.2)  L 


 


 6.1 g/dL


(6.4-8.2)  L


 


Albumin


 2.6 g/dL


(3.4-5.0)  L 


 


 2.1 g/dL


(3.4-5.0)  L


 


Albumin/Globulin Ratio


 0.7 (1.0-1.7)


L 


 


 0.5 (1.0-1.7)


L


 


Heterophil Agglutinins


 Negative


(NEGATIVE) 


 


 





 


Influenza Type A Antigen


 Negative


(NEGATIVE) 


 


 





 


Influenza Type B Antigen


 Negative


(NEGATIVE) 


 


 





 


SARS-CoV-2 Antigen (Rapid)


 Negative


(NEGATIVE) 


 


 





 


Group A Streptococcus Rapid


 


 Negative


(NEGATIVE) 


 





 


Test


 10/26/21


06:00 


 


 





 


Urine Collection Type Unknown     


 


Urine Color Yellow     


 


Urine Clarity Hazy     


 


Urine pH


 5.5 (<5.0-8.0)


 


 


 





 


Urine Specific Gravity


 1.015


(1.000-1.030) 


 


 





 


Urine Protein


 30 mg/dL


(NEG-TRACE) 


 


 





 


Urine Glucose (UA)


 Negative mg/dL


(NEG) 


 


 





 


Urine Ketones (Stick)


 Negative mg/dL


(NEG) 


 


 





 


Urine Blood


 Negative (NEG)


 


 


 





 


Urine Nitrite


 Negative (NEG)


 


 


 





 


Urine Bilirubin


 Negative (NEG)


 


 


 





 


Urine Urobilinogen Dipstick


 1.0 mg/dL (0.2


mg/dL) 


 


 





 


Urine Leukocyte Esterase


 Negative (NEG)


 


 


 





 


Urine RBC 0 /HPF (0-2)     


 


Urine WBC


 1-4 /HPF (0-4)


 


 


 





 


Urine Squamous Epithelial


Cells Few /LPF  


 


 


 





 


Urine Amorphous Sediment Present /HPF     


 


Urine Bacteria


 Few /HPF


(0-FEW) 


 


 





 


Urine Mucus Slight /LPF     





                                Laboratory Tests


10/25/21 12:32








                                Laboratory Tests


10/25/21 12:32








10/26/21 04:30











ASSESSMENT/PLAN


ASSESSMENT/PLAN


1. Fever/sepsis/bacteremia: likely source below


2. S/P PPM with eventual multiple lead revision: 8/2021


3. Hx of CHB


4. Hx of PSVT with previous ablation


5. SON


6. Transaminitis with hepatomegaly





Recommendations


1. Antibiotic therapy. ID consult


2. PPM done in [facility] and will transfer





CRISTEL CHO MD 10/26/21 2246:


CARDIAC CONSULT


ASSESSMENT/PLAN


ASSESSMENT/PLAN


Pt. seen and examined. Agree with above NP note. 


Discussed with [facility]. Plan for transfer for temp pacer, perm pacer extraction and

antibiotics and pacemaker replacement on the right side probably. 


Thanks.











ANAYELI PANDA          Oct 26, 2021 09:54


CRISTEL CHO MD       Oct 26, 2021 22:46

## 2021-10-26 NOTE — NUR
SS following for discharge planning. SS reviewed pt chart and discussed with pt RN. Pt is 
from home and is currently on room air. COVID19 negative. Per Cardiology, pt has infected 
pacemaker. Positive blood cultures. ID consulted. Pt on IV Vancomycin and IV Zosyn. 
Cardiology requesting transfer to  at this time. SS contacted  transfer team, 
[phone], and spoke with Mary, [phone]; fax [phone]. SS phoned and faxed 
clinical to  as requested. Images clouded to . SS awaiting acceptance decision from  
at this time. SS will continue to follow for discharge planning. 

-------------------------------------------------------------------------------

Addendum: 10/26/21 at 1553 by JAMIE JOHNS SS

-------------------------------------------------------------------------------

Pt accepted at . Accepting physician Dr. Jonah Remy. Currently awaiting bed 
availability at this time. Packet, ambulance form, and transfer form on the chart.

## 2021-10-26 NOTE — RAD
CT of the abdomen and pelvis without contrast.  10/26/2021 11:53 AM



Indication: Reason: pt with sacroiliac pain Lt wtih bactemia, abn lft and zheng  



Comparison Study: CT of the abdomen and pelvis December 30, 2012. Ultrasound of the abdomen October 2
5, 2021.



Technique: Multidetector CT imaging of the abdomen pelvis is obtained without administration of contr
ast.



Findings: Visualized lung bases demonstrate trace pleural effusions. There is bilateral basilar intra
lobular several thickening and groundglass opacities. The appearance favors pulmonary edema. There is
 a nodular opacity in the left lower lobe measuring 7 mm in diameter.



Liver is grossly unremarkable. The gallbladder appears mildly enlarged. There is a subtle hyperdensit
y within the gallbladder lumen likely a gallstone. This was not well seen on recent ultrasound exam. 
The spleen, adrenal glands, and pancreas have a grossly unremarkable noncontrast enhanced appearance.
 No hydronephrosis or nephrolithiasis is identified. There is no evidence of bowel obstruction. There
 is a small amount of free fluid seen in the pelvis. Bladder is grossly unremarkable. The appendix is
 unremarkable. No acute osseous abnormalities are identified. No lytic or blastic lesions are identif
ied. Sacroiliac joints are symmetric without evidence of acute abnormality.



IMPRESSION:



1. Trace bilateral pleural effusions with underlying interstitial alveolar opacities favoring pulmona
ry edema.



2. 7 mm nodule, left lower lobe. 3-6 month follow-up exam recommended to ensure resolution.



3. Probable cholelithiasis, although not well seen on recent ultrasound exam. If clinical if there is
 clinical concern for cholecystitis, or biliary colic follow-up MRCP may be helpful



4. No acute osseous abnormality of the sacroiliac joints is identified

 



CT DOSING PQRS STATEMENT: 

One or more of the following individualized dose reduction techniques were utilized for this examinat
ion: 

 1. Automated exposure control 

 2. Adjustment of the mA and/or kV according to patient size  

3. Use of iterative reconstruction technique



Electronically signed by: Rich Fu MD (10/26/2021 12:32 PM) [id number]

## 2021-10-26 NOTE — PDOC
Infectious Disease Note


Vital Signs:


Vital Signs





Vital Signs








  Date Time  Temp Pulse Resp B/P (MAP) Pulse Ox O2 Delivery O2 Flow Rate FiO2


 


10/26/21 08:20      Room Air  


 


10/26/21 08:05   18  94   


 


10/26/21 07:00 98.3 99  119/62 (81)    





 98.3       











Medications:


Inpatient Meds:


Medications reviewed.





Labs:


Lab





Laboratory Tests








Test


 10/25/21


12:32 10/25/21


12:40 10/25/21


16:47 10/26/21


04:30


 


White Blood Count


 8.5 x10^3/uL


(4.0-11.0) 


 


 





 


Red Blood Count


 4.05 x10^6/uL


(3.50-5.40) 


 


 





 


Hemoglobin


 10.6 g/dL


(12.0-15.5) 


 


 





 


Hematocrit


 32.6 %


(36.0-47.0) 


 


 





 


Mean Corpuscular Volume 80 fL ()    


 


Mean Corpuscular Hemoglobin 26 pg (25-35)    


 


Mean Corpuscular Hemoglobin


Concent 33 g/dL


(31-37) 


 


 





 


Red Cell Distribution Width


 21.6 %


(11.5-14.5) 


 


 





 


Platelet Count


 148 x10^3/uL


(140-400) 


 


 





 


Neutrophils (%) (Auto) 94 % (31-73)    


 


Lymphocytes (%) (Auto) 2 % (24-48)    


 


Monocytes (%) (Auto) 2 % (0-9)    


 


Eosinophils (%) (Auto) 1 % (0-3)    


 


Basophils (%) (Auto) 0 % (0-3)    


 


Neutrophils # (Auto)


 8.0 x10^3/uL


(1.8-7.7) 


 


 





 


Lymphocytes # (Auto)


 0.2 x10^3/uL


(1.0-4.8) 


 


 





 


Monocytes # (Auto)


 0.2 x10^3/uL


(0.0-1.1) 


 


 





 


Eosinophils # (Auto)


 0.0 x10^3/uL


(0.0-0.7) 


 


 





 


Basophils # (Auto)


 0.0 x10^3/uL


(0.0-0.2) 


 


 





 


Segmented Neutrophils % 54 % (35-66)    


 


Band Neutrophils % 41 % (0-9)    


 


Lymphocytes % 2 % (24-48)    


 


Monocytes % 1 % (0-10)    


 


Eosinophils % 2 % (0-5)    


 


Platelet Estimate


 Adequate


(ADEQUATE) 


 


 





 


Large Platelets Present    


 


Hypochromasia Mod    


 


Poikilocytosis Mod    


 


Anisocytosis Mod    


 


Microcytosis Mod    


 


Target Cells Occ    


 


Tear Drop Cells Occ    


 


Ovalocytes Few    


 


Gabriel Cells Few    


 


Crenated Cell     


 


Schistocytes Occ    


 


RBC Morphology Bizarre Forms Present    


 


Sodium Level


 132 mmol/L


(136-145) 


 


 132 mmol/L


(136-145)


 


Potassium Level


 4.3 mmol/L


(3.5-5.1) 


 


 4.2 mmol/L


(3.5-5.1)


 


Chloride Level


 97 mmol/L


() 


 


 99 mmol/L


()


 


Carbon Dioxide Level


 21 mmol/L


(21-32) 


 


 18 mmol/L


(21-32)


 


Anion Gap 14 (6-14)    15 (6-14) 


 


Blood Urea Nitrogen


 27 mg/dL


(7-20) 


 


 29 mg/dL


(7-20)


 


Creatinine


 1.6 mg/dL


(0.6-1.0) 


 


 1.5 mg/dL


(0.6-1.0)


 


Estimated GFR


(Cockcroft-Gault) 35.2 


 


 


 37.9 





 


BUN/Creatinine Ratio 17 (6-20)    19 (6-20) 


 


Glucose Level


 133 mg/dL


(70-99) 


 


 71 mg/dL


(70-99)


 


Lactic Acid Level


 2.6 mmol/L


(0.4-2.0) 


 1.2 mmol/L


(0.4-2.0) 





 


Calcium Level


 8.0 mg/dL


(8.5-10.1) 


 


 7.6 mg/dL


(8.5-10.1)


 


Iron Level


 5 ug/dL


() 


 


 





 


Total Iron Binding Capacity


 303 ug/dL


(250-450) 


 


 





 


Iron Saturation 2 % (15-34)    


 


Total Bilirubin


 1.2 mg/dL


(0.2-1.0) 


 


 1.5 mg/dL


(0.2-1.0)


 


Aspartate Amino Transf


(AST/SGOT) 190 U/L


(15-37) 


 


 302 U/L


(15-37)


 


Alanine Aminotransferase


(ALT/SGPT) 166 U/L


(14-59) 


 


 249 U/L


(14-59)


 


Alkaline Phosphatase


 407 U/L


() 


 


 319 U/L


()


 


Total Protein


 6.1 g/dL


(6.4-8.2) 


 


 6.1 g/dL


(6.4-8.2)


 


Albumin


 2.6 g/dL


(3.4-5.0) 


 


 2.1 g/dL


(3.4-5.0)


 


Albumin/Globulin Ratio 0.7 (1.0-1.7)    0.5 (1.0-1.7) 


 


Heterophil Agglutinins


 Negative


(NEGATIVE) 


 


 





 


Influenza Type A Antigen


 Negative


(NEGATIVE) 


 


 





 


Influenza Type B Antigen


 Negative


(NEGATIVE) 


 


 





 


SARS-CoV-2 RNA (OH)


 Negative


(Negative) 


 


 





 


SARS-CoV-2 Antigen (Rapid)


 Negative


(NEGATIVE) 


 


 





 


Group A Streptococcus Rapid


 


 Negative


(NEGATIVE) 


 





 


Test


 10/26/21


06:00 


 


 





 


Urine Collection Type Unknown    


 


Urine Color Yellow    


 


Urine Clarity Hazy    


 


Urine pH 5.5 (<5.0-8.0)    


 


Urine Specific Gravity


 1.015


(1.000-1.030) 


 


 





 


Urine Protein


 30 mg/dL


(NEG-TRACE) 


 


 





 


Urine Glucose (UA)


 Negative mg/dL


(NEG) 


 


 





 


Urine Ketones (Stick)


 Negative mg/dL


(NEG) 


 


 





 


Urine Blood Negative (NEG)    


 


Urine Nitrite Negative (NEG)    


 


Urine Bilirubin Negative (NEG)    


 


Urine Urobilinogen Dipstick


 1.0 mg/dL (0.2


mg/dL) 


 


 





 


Urine Leukocyte Esterase Negative (NEG)    


 


Urine RBC 0 /HPF (0-2)    


 


Urine WBC 1-4 /HPF (0-4)    


 


Urine Squamous Epithelial


Cells Few /LPF 


 


 


 





 


Urine Amorphous Sediment Present /HPF    


 


Urine Bacteria


 Few /HPF


(0-FEW) 


 


 





 


Urine Mucus Slight /LPF    











Objective:


Assessment:


Patient seen and examined


ID consult dictated








Impression





Sepsis from gram-positive bacteremia


Gram-positive bacteremia 4 out of 4 bottles present on admission source appears 

as below


Status post PPM with drainage


Status post PPM with multiple lead revisions August 2021 at [facility]


Bandemia


Lactic acidosis


Left sacroiliac and hip pain


SON


Abnormal LFTs, hyperbilirubinemia , abdominal ultra sound shows hepatomegaly


Protein -calorie malnutrition


History of tobaccoism


History of PSVT with previous ablation


History of CHB





Plan:


Plan of Care


DC IV vancomycin due to SON


Continue Zosyn


Start daptomycin


Follow-up GPC in blood cultures


Repeat blood cultures


Obtain CT pelvis


Agree with plans to transfer to [facility] for further evaluation and 

treatment


Patient may need PPM explant


Discussed with Dr. Johnson


Thank you for this consult








[id number]











DELL FUENTES MD           Oct 26, 2021 10:39